# Patient Record
Sex: FEMALE | Race: WHITE | Employment: OTHER | URBAN - METROPOLITAN AREA
[De-identification: names, ages, dates, MRNs, and addresses within clinical notes are randomized per-mention and may not be internally consistent; named-entity substitution may affect disease eponyms.]

---

## 2019-09-17 ENCOUNTER — HOSPITAL ENCOUNTER (INPATIENT)
Age: 69
LOS: 2 days | Discharge: HOME | DRG: 291 | End: 2019-09-19
Attending: EMERGENCY MEDICINE | Admitting: INTERNAL MEDICINE
Payer: MEDICARE

## 2019-09-17 ENCOUNTER — APPOINTMENT (OUTPATIENT)
Dept: CT IMAGING | Age: 69
DRG: 291 | End: 2019-09-17
Payer: MEDICARE

## 2019-09-17 ENCOUNTER — APPOINTMENT (OUTPATIENT)
Dept: GENERAL RADIOLOGY | Age: 69
DRG: 291 | End: 2019-09-17
Payer: MEDICARE

## 2019-09-17 DIAGNOSIS — I50.1 ACUTE LEFT-SIDED CHF (CONGESTIVE HEART FAILURE) (HCC): ICD-10-CM

## 2019-09-17 DIAGNOSIS — E87.70 HYPERVOLEMIA, UNSPECIFIED HYPERVOLEMIA TYPE: ICD-10-CM

## 2019-09-17 DIAGNOSIS — R06.09 DYSPNEA ON EXERTION: Primary | ICD-10-CM

## 2019-09-17 LAB
ALBUMIN SERPL-MCNC: 4.3 G/DL (ref 3.4–5)
ALP BLD-CCNC: 73 U/L (ref 40–129)
ALT SERPL-CCNC: 13 U/L (ref 10–40)
ANION GAP SERPL CALCULATED.3IONS-SCNC: 14 MMOL/L (ref 3–16)
AST SERPL-CCNC: 15 U/L (ref 15–37)
BASE EXCESS VENOUS: 7 (ref -3–3)
BASOPHILS ABSOLUTE: 0.1 K/UL (ref 0–0.2)
BASOPHILS RELATIVE PERCENT: 1 %
BILIRUB SERPL-MCNC: 1 MG/DL (ref 0–1)
BILIRUBIN DIRECT: <0.2 MG/DL (ref 0–0.3)
BILIRUBIN URINE: NEGATIVE
BILIRUBIN, INDIRECT: NORMAL MG/DL (ref 0–1)
BLOOD, URINE: NEGATIVE
BUN BLDV-MCNC: 20 MG/DL (ref 7–20)
CALCIUM SERPL-MCNC: 8.7 MG/DL (ref 8.3–10.6)
CHLORIDE BLD-SCNC: 99 MMOL/L (ref 99–110)
CLARITY: CLEAR
CO2: 28 MMOL/L (ref 21–32)
COLOR: YELLOW
CREAT SERPL-MCNC: 0.7 MG/DL (ref 0.6–1.2)
EOSINOPHILS ABSOLUTE: 0.3 K/UL (ref 0–0.6)
EOSINOPHILS RELATIVE PERCENT: 3.7 %
GFR AFRICAN AMERICAN: >60
GFR NON-AFRICAN AMERICAN: >60
GLUCOSE BLD-MCNC: 108 MG/DL (ref 70–99)
GLUCOSE URINE: NEGATIVE MG/DL
HCO3 VENOUS: 31.3 MMOL/L (ref 23–29)
HCT VFR BLD CALC: 34.4 % (ref 36–48)
HEMOGLOBIN: 11.4 G/DL (ref 12–16)
INR BLD: 1.91 (ref 0.86–1.14)
KETONES, URINE: NEGATIVE MG/DL
LEUKOCYTE ESTERASE, URINE: NEGATIVE
LIPASE: 30 U/L (ref 13–60)
LYMPHOCYTES ABSOLUTE: 1.1 K/UL (ref 1–5.1)
LYMPHOCYTES RELATIVE PERCENT: 15.4 %
MCH RBC QN AUTO: 30.3 PG (ref 26–34)
MCHC RBC AUTO-ENTMCNC: 33.2 G/DL (ref 31–36)
MCV RBC AUTO: 91.3 FL (ref 80–100)
MICROSCOPIC EXAMINATION: NORMAL
MONOCYTES ABSOLUTE: 0.4 K/UL (ref 0–1.3)
MONOCYTES RELATIVE PERCENT: 5.7 %
NEUTROPHILS ABSOLUTE: 5.2 K/UL (ref 1.7–7.7)
NEUTROPHILS RELATIVE PERCENT: 74.2 %
NITRITE, URINE: NEGATIVE
O2 SAT, VEN: 84 %
PCO2, VEN: 45.5 MM HG (ref 40–50)
PDW BLD-RTO: 14.7 % (ref 12.4–15.4)
PERFORMED ON: ABNORMAL
PH UA: 6 (ref 5–8)
PH VENOUS: 7.45 (ref 7.35–7.45)
PLATELET # BLD: 260 K/UL (ref 135–450)
PMV BLD AUTO: 8.8 FL (ref 5–10.5)
PO2, VEN: 47 MM HG
POC SAMPLE TYPE: ABNORMAL
POTASSIUM REFLEX MAGNESIUM: 4.4 MMOL/L (ref 3.5–5.1)
PRO-BNP: 301 PG/ML (ref 0–124)
PROTEIN UA: NEGATIVE MG/DL
PROTHROMBIN TIME: 21.8 SEC (ref 9.8–13)
RBC # BLD: 3.77 M/UL (ref 4–5.2)
SODIUM BLD-SCNC: 141 MMOL/L (ref 136–145)
SPECIFIC GRAVITY UA: 1.01 (ref 1–1.03)
T4 FREE: 2.3 NG/DL (ref 0.9–1.8)
TCO2 CALC VENOUS: 33 MMOL/L
TOTAL PROTEIN: 7.1 G/DL (ref 6.4–8.2)
TROPONIN: <0.01 NG/ML
TSH REFLEX: 0.1 UIU/ML (ref 0.27–4.2)
URINE TYPE: NORMAL
UROBILINOGEN, URINE: 0.2 E.U./DL
WBC # BLD: 7 K/UL (ref 4–11)

## 2019-09-17 PROCEDURE — 83690 ASSAY OF LIPASE: CPT

## 2019-09-17 PROCEDURE — 6360000004 HC RX CONTRAST MEDICATION: Performed by: STUDENT IN AN ORGANIZED HEALTH CARE EDUCATION/TRAINING PROGRAM

## 2019-09-17 PROCEDURE — 85610 PROTHROMBIN TIME: CPT

## 2019-09-17 PROCEDURE — 84484 ASSAY OF TROPONIN QUANT: CPT

## 2019-09-17 PROCEDURE — 85025 COMPLETE CBC W/AUTO DIFF WBC: CPT

## 2019-09-17 PROCEDURE — 81003 URINALYSIS AUTO W/O SCOPE: CPT

## 2019-09-17 PROCEDURE — 84443 ASSAY THYROID STIM HORMONE: CPT

## 2019-09-17 PROCEDURE — 6360000002 HC RX W HCPCS: Performed by: INTERNAL MEDICINE

## 2019-09-17 PROCEDURE — 93005 ELECTROCARDIOGRAM TRACING: CPT | Performed by: STUDENT IN AN ORGANIZED HEALTH CARE EDUCATION/TRAINING PROGRAM

## 2019-09-17 PROCEDURE — 83880 ASSAY OF NATRIURETIC PEPTIDE: CPT

## 2019-09-17 PROCEDURE — 99223 1ST HOSP IP/OBS HIGH 75: CPT | Performed by: INTERNAL MEDICINE

## 2019-09-17 PROCEDURE — 84439 ASSAY OF FREE THYROXINE: CPT

## 2019-09-17 PROCEDURE — 99285 EMERGENCY DEPT VISIT HI MDM: CPT

## 2019-09-17 PROCEDURE — 2060000000 HC ICU INTERMEDIATE R&B

## 2019-09-17 PROCEDURE — 71046 X-RAY EXAM CHEST 2 VIEWS: CPT

## 2019-09-17 PROCEDURE — 6370000000 HC RX 637 (ALT 250 FOR IP): Performed by: INTERNAL MEDICINE

## 2019-09-17 PROCEDURE — 82803 BLOOD GASES ANY COMBINATION: CPT

## 2019-09-17 PROCEDURE — 2580000003 HC RX 258: Performed by: INTERNAL MEDICINE

## 2019-09-17 PROCEDURE — 36415 COLL VENOUS BLD VENIPUNCTURE: CPT

## 2019-09-17 PROCEDURE — 80076 HEPATIC FUNCTION PANEL: CPT

## 2019-09-17 PROCEDURE — 80048 BASIC METABOLIC PNL TOTAL CA: CPT

## 2019-09-17 PROCEDURE — 6370000000 HC RX 637 (ALT 250 FOR IP): Performed by: STUDENT IN AN ORGANIZED HEALTH CARE EDUCATION/TRAINING PROGRAM

## 2019-09-17 PROCEDURE — 71275 CT ANGIOGRAPHY CHEST: CPT

## 2019-09-17 RX ORDER — ONDANSETRON 2 MG/ML
4 INJECTION INTRAMUSCULAR; INTRAVENOUS EVERY 6 HOURS PRN
Status: DISCONTINUED | OUTPATIENT
Start: 2019-09-17 | End: 2019-09-19 | Stop reason: HOSPADM

## 2019-09-17 RX ORDER — NIFEDIPINE 30 MG/1
30 TABLET, FILM COATED, EXTENDED RELEASE ORAL DAILY
Status: DISCONTINUED | OUTPATIENT
Start: 2019-09-18 | End: 2019-09-18

## 2019-09-17 RX ORDER — SODIUM CHLORIDE 0.9 % (FLUSH) 0.9 %
10 SYRINGE (ML) INJECTION PRN
Status: DISCONTINUED | OUTPATIENT
Start: 2019-09-17 | End: 2019-09-19 | Stop reason: HOSPADM

## 2019-09-17 RX ORDER — ROPINIROLE 1 MG/1
2 TABLET, FILM COATED ORAL 3 TIMES DAILY PRN
Status: DISCONTINUED | OUTPATIENT
Start: 2019-09-17 | End: 2019-09-19 | Stop reason: HOSPADM

## 2019-09-17 RX ORDER — POLYVINYL ALCOHOL 14 MG/ML
1 SOLUTION/ DROPS OPHTHALMIC DAILY
Status: DISCONTINUED | OUTPATIENT
Start: 2019-09-17 | End: 2019-09-19 | Stop reason: HOSPADM

## 2019-09-17 RX ORDER — CALCIUM CARBONATE 500(1250)
1000 TABLET ORAL 3 TIMES DAILY
Status: DISCONTINUED | OUTPATIENT
Start: 2019-09-17 | End: 2019-09-19 | Stop reason: HOSPADM

## 2019-09-17 RX ORDER — WARFARIN SODIUM 5 MG/1
5 TABLET ORAL EVERY EVENING
Status: DISCONTINUED | OUTPATIENT
Start: 2019-09-17 | End: 2019-09-18 | Stop reason: DRUGHIGH

## 2019-09-17 RX ORDER — CYCLOBENZAPRINE HCL 10 MG
10 TABLET ORAL 3 TIMES DAILY PRN
Status: DISCONTINUED | OUTPATIENT
Start: 2019-09-17 | End: 2019-09-19 | Stop reason: HOSPADM

## 2019-09-17 RX ORDER — SODIUM CHLORIDE 0.9 % (FLUSH) 0.9 %
10 SYRINGE (ML) INJECTION EVERY 12 HOURS SCHEDULED
Status: DISCONTINUED | OUTPATIENT
Start: 2019-09-17 | End: 2019-09-19 | Stop reason: HOSPADM

## 2019-09-17 RX ORDER — IBUPROFEN 200 MG
2400 CAPSULE ORAL 3 TIMES DAILY
COMMUNITY
End: 2022-01-06 | Stop reason: CLARIF

## 2019-09-17 RX ORDER — GABAPENTIN 400 MG/1
400 CAPSULE ORAL 2 TIMES DAILY
Status: DISCONTINUED | OUTPATIENT
Start: 2019-09-17 | End: 2019-09-19 | Stop reason: HOSPADM

## 2019-09-17 RX ORDER — ZOLPIDEM TARTRATE 5 MG/1
5 TABLET ORAL NIGHTLY PRN
Status: DISCONTINUED | OUTPATIENT
Start: 2019-09-17 | End: 2019-09-19 | Stop reason: HOSPADM

## 2019-09-17 RX ORDER — ALPRAZOLAM 0.5 MG/1
0.5 TABLET ORAL NIGHTLY PRN
Status: DISCONTINUED | OUTPATIENT
Start: 2019-09-17 | End: 2019-09-19 | Stop reason: HOSPADM

## 2019-09-17 RX ORDER — VALSARTAN AND HYDROCHLOROTHIAZIDE 160; 12.5 MG/1; MG/1
1 TABLET, FILM COATED ORAL DAILY
Status: DISCONTINUED | OUTPATIENT
Start: 2019-09-18 | End: 2019-09-18

## 2019-09-17 RX ORDER — HYDROCODONE BITARTRATE AND ACETAMINOPHEN 5; 325 MG/1; MG/1
1 TABLET ORAL ONCE
Status: COMPLETED | OUTPATIENT
Start: 2019-09-17 | End: 2019-09-17

## 2019-09-17 RX ORDER — FUROSEMIDE 10 MG/ML
40 INJECTION INTRAMUSCULAR; INTRAVENOUS 2 TIMES DAILY
Status: DISCONTINUED | OUTPATIENT
Start: 2019-09-17 | End: 2019-09-19

## 2019-09-17 RX ORDER — HYDROCODONE BITARTRATE AND ACETAMINOPHEN 5; 325 MG/1; MG/1
1 TABLET ORAL EVERY 6 HOURS PRN
Status: DISCONTINUED | OUTPATIENT
Start: 2019-09-17 | End: 2019-09-19 | Stop reason: HOSPADM

## 2019-09-17 RX ADMIN — HYDROCODONE BITARTRATE AND ACETAMINOPHEN 1 TABLET: 5; 325 TABLET ORAL at 11:41

## 2019-09-17 RX ADMIN — Medication 10 ML: at 20:10

## 2019-09-17 RX ADMIN — FUROSEMIDE 40 MG: 10 INJECTION, SOLUTION INTRAMUSCULAR; INTRAVENOUS at 16:53

## 2019-09-17 RX ADMIN — CALCIUM 1000 MG: 500 TABLET ORAL at 20:08

## 2019-09-17 RX ADMIN — IOPAMIDOL 80 ML: 755 INJECTION, SOLUTION INTRAVENOUS at 12:12

## 2019-09-17 RX ADMIN — ROPINIROLE HYDROCHLORIDE 2 MG: 1 TABLET, FILM COATED ORAL at 20:09

## 2019-09-17 RX ADMIN — WARFARIN SODIUM 5 MG: 5 TABLET ORAL at 18:11

## 2019-09-17 RX ADMIN — HYDROCODONE BITARTRATE AND ACETAMINOPHEN 1 TABLET: 5; 325 TABLET ORAL at 18:11

## 2019-09-17 RX ADMIN — CALCIUM 1000 MG: 500 TABLET ORAL at 18:11

## 2019-09-17 RX ADMIN — GABAPENTIN 400 MG: 400 CAPSULE ORAL at 20:08

## 2019-09-17 RX ADMIN — ENOXAPARIN SODIUM 150 MG: 150 INJECTION SUBCUTANEOUS at 20:10

## 2019-09-17 ASSESSMENT — PAIN DESCRIPTION - ORIENTATION: ORIENTATION: OTHER (COMMENT)

## 2019-09-17 ASSESSMENT — PAIN SCALES - GENERAL
PAINLEVEL_OUTOF10: 3
PAINLEVEL_OUTOF10: 6
PAINLEVEL_OUTOF10: 2
PAINLEVEL_OUTOF10: 6
PAINLEVEL_OUTOF10: 3
PAINLEVEL_OUTOF10: 3

## 2019-09-17 ASSESSMENT — PAIN DESCRIPTION - DESCRIPTORS
DESCRIPTORS: ACHING;SORE

## 2019-09-17 ASSESSMENT — PAIN DESCRIPTION - PAIN TYPE
TYPE: CHRONIC PAIN
TYPE: CHRONIC PAIN

## 2019-09-17 ASSESSMENT — PAIN DESCRIPTION - LOCATION
LOCATION: GENERALIZED
LOCATION: GENERALIZED

## 2019-09-17 ASSESSMENT — PAIN DESCRIPTION - PROGRESSION
CLINICAL_PROGRESSION: GRADUALLY IMPROVING
CLINICAL_PROGRESSION: GRADUALLY WORSENING
CLINICAL_PROGRESSION: GRADUALLY IMPROVING

## 2019-09-17 ASSESSMENT — PAIN DESCRIPTION - FREQUENCY
FREQUENCY: CONTINUOUS
FREQUENCY: CONTINUOUS

## 2019-09-17 ASSESSMENT — PAIN DESCRIPTION - ONSET
ONSET: ON-GOING
ONSET: ON-GOING

## 2019-09-17 NOTE — ED PROVIDER NOTES
available for comparison    RADIOLOGY:  CTA PULMONARY W CONTRAST   Final Result      1. No evidence of pulmonary embolus. No acute cardiopulmonary abnormality. XR CHEST STANDARD (2 VW)   Final Result      Clear lungs. Normal cardiomediastinal silhouette. Lumbar intervertebral disc spacer noted.           LABS:   Results for orders placed or performed during the hospital encounter of 09/17/19   CBC Auto Differential   Result Value Ref Range    WBC 7.0 4.0 - 11.0 K/uL    RBC 3.77 (L) 4.00 - 5.20 M/uL    Hemoglobin 11.4 (L) 12.0 - 16.0 g/dL    Hematocrit 34.4 (L) 36.0 - 48.0 %    MCV 91.3 80.0 - 100.0 fL    MCH 30.3 26.0 - 34.0 pg    MCHC 33.2 31.0 - 36.0 g/dL    RDW 14.7 12.4 - 15.4 %    Platelets 116 754 - 010 K/uL    MPV 8.8 5.0 - 10.5 fL    Neutrophils % 74.2 %    Lymphocytes % 15.4 %    Monocytes % 5.7 %    Eosinophils % 3.7 %    Basophils % 1.0 %    Neutrophils Absolute 5.2 1.7 - 7.7 K/uL    Lymphocytes Absolute 1.1 1.0 - 5.1 K/uL    Monocytes Absolute 0.4 0.0 - 1.3 K/uL    Eosinophils Absolute 0.3 0.0 - 0.6 K/uL    Basophils Absolute 0.1 0.0 - 0.2 K/uL   Basic Metabolic Panel w/ Reflex to MG   Result Value Ref Range    Sodium 141 136 - 145 mmol/L    Potassium reflex Magnesium 4.4 3.5 - 5.1 mmol/L    Chloride 99 99 - 110 mmol/L    CO2 28 21 - 32 mmol/L    Anion Gap 14 3 - 16    Glucose 108 (H) 70 - 99 mg/dL    BUN 20 7 - 20 mg/dL    CREATININE 0.7 0.6 - 1.2 mg/dL    GFR Non-African American >60 >60    GFR African American >60 >60    Calcium 8.7 8.3 - 10.6 mg/dL   Hepatic Function Panel   Result Value Ref Range    Total Protein 7.1 6.4 - 8.2 g/dL    Alb 4.3 3.4 - 5.0 g/dL    Alkaline Phosphatase 73 40 - 129 U/L    ALT 13 10 - 40 U/L    AST 15 15 - 37 U/L    Total Bilirubin 1.0 0.0 - 1.0 mg/dL    Bilirubin, Direct <0.2 0.0 - 0.3 mg/dL    Bilirubin, Indirect see below 0.0 - 1.0 mg/dL   Lipase   Result Value Ref Range    Lipase 30.0 13.0 - 60.0 U/L   Troponin   Result Value Ref Range    Troponin <0.01 <0.01 ng/mL   Brain Natriuretic Peptide   Result Value Ref Range    Pro- (H) 0 - 124 pg/mL   Protime-INR   Result Value Ref Range    Protime 21.8 (H) 9.8 - 13.0 sec    INR 1.91 (H) 0.86 - 1.14   POCT Venous   Result Value Ref Range    pH, [place] 7.446 7.350 - 7.450    pCO2, [place] 45.5 40.0 - 50.0 mm Hg    pO2, [place] 47 Not Established mm Hg    HCO3, Venous 31.3 (H) 23.0 - 29.0 mmol/L    Base Excess, [place] 7 (H) -3 - 3    O2 Sat, [place] 84 Not Established %    TC02 (Calc), [place] 33 Not Established mmol/L    Sample Type TOMEKA     Performed on SEE BELOW        ED BEDSIDE ULTRASOUND:  N/A    RECENT VITALS:  BP: (!) 146/67, Temp: 98.7 °F (37.1 °C), Pulse: 85,Resp: 15, SpO2: 99 %     Procedures   Procedures    ED Course     Nursing Notes, Past Medical Hx, Past Surgical Hx, Social Hx, Allergies, and Family Hx were reviewed. The patient was given the followingmedications:  Orders Placed This Encounter   Medications    HYDROcodone-acetaminophen (NORCO) 5-325 MG per tablet 1 tablet    iopamidol (ISOVUE-370) 76 % injection 80 mL       CONSULTS:  IP CONSULT TO HOSPITALIST  IP CONSULT TO Edison Soler / SHELL / Radha Daniel is a 81 y.o. female here for dyspnea on exertion associated with bilateral lower extremity swelling with a former history of DVT last PE in the setting of a recent parathyroid surgery. Differential includes DVT/PE versus CHF versus ACS versus CKD versus liver disease versus anemia. Patient with a negative initial troponin, minimally elevated BNP which could be elevated in the setting of her body habitus. EKG reassuring. Rest of her work-up was reassuring. Patient had a minimally subtherapeutic INR on warfarin so a CTPA was ordered which did not show any signs of a pulmonary edema or pulmonary embolus. Patient was admitted for further evaluation of her volume overload and dyspnea on exertion. This patient was also evaluated by the attending physician.

## 2019-09-17 NOTE — PLAN OF CARE
Problem: Falls - Risk of:  Goal: Will remain free from falls  Description  Will remain free from falls  Outcome: Ongoing  Low fall risk, call light with in reach. Encourage pt to use call light. Will continue to monitor safety. Problem: Risk for Impaired Skin Integrity  Goal: Tissue integrity - skin and mucous membranes  Description  Structural intactness and normal physiological function of skin and  mucous membranes. Outcome: Ongoing   Patient has surgical incision to neck and scattered bruising. No breakdown, will continue to monitor. Problem: OXYGENATION/RESPIRATORY FUNCTION  Goal: Patient will maintain patent airway  Outcome: Ongoing   Patient off oxygen was sitting at 92 on Ra will continue to monitor.  Will get IV lasix

## 2019-09-17 NOTE — ED PROVIDER NOTES
ED Attending Attestation Note     Date of evaluation: 9/17/2019    This patient was seen by the resident. I have seen and examined the patient, agree with the workup, evaluation, management and diagnosis. The care plan has been discussed. I have reviewed the ECG and concur with the resident's interpretation. My assessment reveals alert female status post thyroid surgery who presents with lower extremity swelling bilateral and shortness of breath. She is on Coumadin for DVTs. She states that does not feel like DVT type of pain.   She is awake alert he has edema noted up into her thighs no respiratory distress     Yohana Tristan MD  09/17/19 3611

## 2019-09-17 NOTE — CONSULTS
[facility]    Cardiology Consult/H&P  Consulting Cardiologist Shawn Martínez M.D., [facility]  Referring Provider:  Sonali Katz MD    9/17/2019, 5:42 PM    Chief Complaint   Patient presents with    Other     weight gain     Shortness of Breath      Primary Cardiologist:  Asked by Levon Kaba MD/Herrera Thompson MD to evaluate and assess this patient's is admitted with shortness of breath and significant leg edema. History of Present Illness:   Mirian Jackson is a 81 y.o. female is known to me from many years ago is admitted currently with increasing edema bilaterally and shortness of breath and generally feeling poorly. She had been on Coumadin long-term for DVT recurring in her legs. She did undergo parathyroidectomy 1 week ago and the last day or 2 has noted significant leg edema and shortness of breath and dyspnea. She thinks that she may have consumed more fluid lately. Denies any chest pains denies any known coronary artery disease. 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 History of pulmonary emboli. Past Medical History:   has a past medical history of DVT (deep venous thrombosis) ([address] Hypertension, Lupus anticoagulant disorder ([facility] [address] and PE (pulmonary thromboembolism) ([facility] [address] Surgical History:   has a past surgical history that includes brain surgery; Cholecystectomy; Foot surgery; Appendectomy; and Tonsillectomy. Social History:   reports that she has never smoked. She has never used smokeless tobacco. She reports that she does not drink alcohol or use drugs. Family History:  family history includes Cancer in her brother and sister; Heart Attack in her mother; Stroke in her father. Home Medications:  Prior to Admission medications    Medication Sig Start Date End Date Taking?  Authorizing Provider   calcium 600 MG TABS tablet Take 2,400 mg by mouth 3 times daily    Yes Historical Provider, MD   warfarin (COUMADIN) 5 MG tablet

## 2019-09-18 ENCOUNTER — APPOINTMENT (OUTPATIENT)
Dept: VASCULAR LAB | Age: 69
DRG: 291 | End: 2019-09-18
Payer: MEDICARE

## 2019-09-18 LAB
ANION GAP SERPL CALCULATED.3IONS-SCNC: 12 MMOL/L (ref 3–16)
BUN BLDV-MCNC: 21 MG/DL (ref 7–20)
CALCIUM SERPL-MCNC: 9.3 MG/DL (ref 8.3–10.6)
CHLORIDE BLD-SCNC: 96 MMOL/L (ref 99–110)
CHOLESTEROL, TOTAL: 153 MG/DL (ref 0–199)
CO2: 31 MMOL/L (ref 21–32)
CREAT SERPL-MCNC: 0.8 MG/DL (ref 0.6–1.2)
EKG ATRIAL RATE: 76 BPM
EKG ATRIAL RATE: 94 BPM
EKG DIAGNOSIS: NORMAL
EKG DIAGNOSIS: NORMAL
EKG P AXIS: 15 DEGREES
EKG P AXIS: 89 DEGREES
EKG P-R INTERVAL: 190 MS
EKG P-R INTERVAL: 216 MS
EKG Q-T INTERVAL: 330 MS
EKG Q-T INTERVAL: 374 MS
EKG QRS DURATION: 92 MS
EKG QRS DURATION: 94 MS
EKG QTC CALCULATION (BAZETT): 420 MS
EKG QTC CALCULATION (BAZETT): 468 MS
EKG R AXIS: -6 DEGREES
EKG R AXIS: 10 DEGREES
EKG T AXIS: 11 DEGREES
EKG T AXIS: 18 DEGREES
EKG VENTRICULAR RATE: 121 BPM
EKG VENTRICULAR RATE: 76 BPM
GFR AFRICAN AMERICAN: >60
GFR NON-AFRICAN AMERICAN: >60
GLUCOSE BLD-MCNC: 104 MG/DL (ref 70–99)
HDLC SERPL-MCNC: 51 MG/DL (ref 40–60)
INR BLD: 1.88 (ref 0.86–1.14)
LDL CHOLESTEROL CALCULATED: 64 MG/DL
LV EF: 58 %
LVEF MODALITY: NORMAL
MAGNESIUM: 1.8 MG/DL (ref 1.8–2.4)
POTASSIUM SERPL-SCNC: 3.8 MMOL/L (ref 3.5–5.1)
PROTHROMBIN TIME: 21.4 SEC (ref 9.8–13)
SODIUM BLD-SCNC: 139 MMOL/L (ref 136–145)
TRIGL SERPL-MCNC: 189 MG/DL (ref 0–150)
VLDLC SERPL CALC-MCNC: 38 MG/DL

## 2019-09-18 PROCEDURE — 83735 ASSAY OF MAGNESIUM: CPT

## 2019-09-18 PROCEDURE — C8929 TTE W OR WO FOL WCON,DOPPLER: HCPCS

## 2019-09-18 PROCEDURE — 94760 N-INVAS EAR/PLS OXIMETRY 1: CPT

## 2019-09-18 PROCEDURE — 85610 PROTHROMBIN TIME: CPT

## 2019-09-18 PROCEDURE — 6360000004 HC RX CONTRAST MEDICATION: Performed by: INTERNAL MEDICINE

## 2019-09-18 PROCEDURE — 2580000003 HC RX 258: Performed by: INTERNAL MEDICINE

## 2019-09-18 PROCEDURE — 2060000000 HC ICU INTERMEDIATE R&B

## 2019-09-18 PROCEDURE — 6370000000 HC RX 637 (ALT 250 FOR IP): Performed by: INTERNAL MEDICINE

## 2019-09-18 PROCEDURE — 93005 ELECTROCARDIOGRAM TRACING: CPT | Performed by: INTERNAL MEDICINE

## 2019-09-18 PROCEDURE — 36415 COLL VENOUS BLD VENIPUNCTURE: CPT

## 2019-09-18 PROCEDURE — 6360000002 HC RX W HCPCS: Performed by: EMERGENCY MEDICINE

## 2019-09-18 PROCEDURE — 80061 LIPID PANEL: CPT

## 2019-09-18 PROCEDURE — 6360000002 HC RX W HCPCS: Performed by: INTERNAL MEDICINE

## 2019-09-18 PROCEDURE — 99233 SBSQ HOSP IP/OBS HIGH 50: CPT | Performed by: INTERNAL MEDICINE

## 2019-09-18 PROCEDURE — 93970 EXTREMITY STUDY: CPT

## 2019-09-18 PROCEDURE — 93010 ELECTROCARDIOGRAM REPORT: CPT | Performed by: INTERNAL MEDICINE

## 2019-09-18 PROCEDURE — 90686 IIV4 VACC NO PRSV 0.5 ML IM: CPT | Performed by: EMERGENCY MEDICINE

## 2019-09-18 PROCEDURE — APPSS30 APP SPLIT SHARED TIME 16-30 MINUTES: Performed by: NURSE PRACTITIONER

## 2019-09-18 PROCEDURE — G0008 ADMIN INFLUENZA VIRUS VAC: HCPCS | Performed by: EMERGENCY MEDICINE

## 2019-09-18 PROCEDURE — 80048 BASIC METABOLIC PNL TOTAL CA: CPT

## 2019-09-18 RX ORDER — POTASSIUM CHLORIDE 20 MEQ/1
20 TABLET, EXTENDED RELEASE ORAL ONCE
Status: COMPLETED | OUTPATIENT
Start: 2019-09-18 | End: 2019-09-18

## 2019-09-18 RX ORDER — WARFARIN SODIUM 5 MG/1
5 TABLET ORAL DAILY
Status: DISCONTINUED | OUTPATIENT
Start: 2019-09-19 | End: 2019-09-19 | Stop reason: HOSPADM

## 2019-09-18 RX ORDER — WARFARIN SODIUM 7.5 MG/1
7.5 TABLET ORAL
Status: COMPLETED | OUTPATIENT
Start: 2019-09-18 | End: 2019-09-18

## 2019-09-18 RX ADMIN — FUROSEMIDE 40 MG: 10 INJECTION, SOLUTION INTRAMUSCULAR; INTRAVENOUS at 10:07

## 2019-09-18 RX ADMIN — ENOXAPARIN SODIUM 150 MG: 150 INJECTION SUBCUTANEOUS at 21:30

## 2019-09-18 RX ADMIN — GABAPENTIN 400 MG: 400 CAPSULE ORAL at 10:07

## 2019-09-18 RX ADMIN — ENOXAPARIN SODIUM 150 MG: 150 INJECTION SUBCUTANEOUS at 09:38

## 2019-09-18 RX ADMIN — WARFARIN SODIUM 7.5 MG: 7.5 TABLET ORAL at 17:29

## 2019-09-18 RX ADMIN — GABAPENTIN 400 MG: 400 CAPSULE ORAL at 21:30

## 2019-09-18 RX ADMIN — HYDROCODONE BITARTRATE AND ACETAMINOPHEN 1 TABLET: 5; 325 TABLET ORAL at 12:41

## 2019-09-18 RX ADMIN — CALCIUM 1000 MG: 500 TABLET ORAL at 10:07

## 2019-09-18 RX ADMIN — Medication 10 ML: at 09:36

## 2019-09-18 RX ADMIN — CALCIUM 1000 MG: 500 TABLET ORAL at 21:30

## 2019-09-18 RX ADMIN — ROPINIROLE HYDROCHLORIDE 2 MG: 1 TABLET, FILM COATED ORAL at 21:30

## 2019-09-18 RX ADMIN — FUROSEMIDE 40 MG: 10 INJECTION, SOLUTION INTRAMUSCULAR; INTRAVENOUS at 17:28

## 2019-09-18 RX ADMIN — ALPRAZOLAM 0.5 MG: 0.5 TABLET ORAL at 00:07

## 2019-09-18 RX ADMIN — HYDROCODONE BITARTRATE AND ACETAMINOPHEN 1 TABLET: 5; 325 TABLET ORAL at 06:43

## 2019-09-18 RX ADMIN — CALCIUM 1000 MG: 500 TABLET ORAL at 17:29

## 2019-09-18 RX ADMIN — POLYVINYL ALCOHOL 1 DROP: 14 SOLUTION/ DROPS OPHTHALMIC at 09:37

## 2019-09-18 RX ADMIN — HYDROCODONE BITARTRATE AND ACETAMINOPHEN 1 TABLET: 5; 325 TABLET ORAL at 00:06

## 2019-09-18 RX ADMIN — POTASSIUM CHLORIDE 20 MEQ: 20 TABLET, EXTENDED RELEASE ORAL at 12:41

## 2019-09-18 RX ADMIN — ZOLPIDEM TARTRATE 5 MG: 5 TABLET ORAL at 00:06

## 2019-09-18 RX ADMIN — INFLUENZA A VIRUS A/BRISBANE/02/2018 IVR-190 (H1N1) ANTIGEN (PROPIOLACTONE INACTIVATED), INFLUENZA A VIRUS A/KANSAS/14/2017 X-327 (H3N2) ANTIGEN (PROPIOLACTONE INACTIVATED), INFLUENZA B VIRUS B/MARYLAND/15/2016 ANTIGEN (PROPIOLACTONE INACTIVATED), INFLUENZA B VIRUS B/PHUKET/3073/2013 BVR-1B ANTIGEN (PROPIOLACTONE INACTIVATED) 0.5 ML: 15; 15; 15; 15 INJECTION, SUSPENSION INTRAMUSCULAR at 10:26

## 2019-09-18 RX ADMIN — HYDROCODONE BITARTRATE AND ACETAMINOPHEN 1 TABLET: 5; 325 TABLET ORAL at 18:44

## 2019-09-18 RX ADMIN — Medication 10 ML: at 21:31

## 2019-09-18 RX ADMIN — PERFLUTREN 2.2 MG: 6.52 INJECTION, SUSPENSION INTRAVENOUS at 13:54

## 2019-09-18 ASSESSMENT — PAIN DESCRIPTION - PROGRESSION
CLINICAL_PROGRESSION: GRADUALLY WORSENING
CLINICAL_PROGRESSION: NOT CHANGED
CLINICAL_PROGRESSION: GRADUALLY WORSENING
CLINICAL_PROGRESSION: GRADUALLY IMPROVING
CLINICAL_PROGRESSION: GRADUALLY WORSENING

## 2019-09-18 ASSESSMENT — PAIN DESCRIPTION - FREQUENCY
FREQUENCY: INTERMITTENT
FREQUENCY: INTERMITTENT
FREQUENCY: CONTINUOUS

## 2019-09-18 ASSESSMENT — PAIN SCALES - GENERAL
PAINLEVEL_OUTOF10: 4
PAINLEVEL_OUTOF10: 5
PAINLEVEL_OUTOF10: 6
PAINLEVEL_OUTOF10: 4
PAINLEVEL_OUTOF10: 5
PAINLEVEL_OUTOF10: 6
PAINLEVEL_OUTOF10: 7
PAINLEVEL_OUTOF10: 3
PAINLEVEL_OUTOF10: 6

## 2019-09-18 ASSESSMENT — PAIN DESCRIPTION - PAIN TYPE
TYPE: CHRONIC PAIN

## 2019-09-18 ASSESSMENT — PAIN DESCRIPTION - ONSET
ONSET: ON-GOING

## 2019-09-18 ASSESSMENT — PAIN DESCRIPTION - LOCATION
LOCATION: GENERALIZED
LOCATION: GENERALIZED
LOCATION: GENERALIZED;HEAD

## 2019-09-18 ASSESSMENT — PAIN DESCRIPTION - DESCRIPTORS
DESCRIPTORS: ACHING;SORE
DESCRIPTORS: DISCOMFORT;HEADACHE
DESCRIPTORS: DISCOMFORT

## 2019-09-18 ASSESSMENT — PAIN DESCRIPTION - ORIENTATION: ORIENTATION: OTHER (COMMENT)

## 2019-09-18 NOTE — PLAN OF CARE
Problem: Pain:  Goal: Pain level will decrease  Description  Pain level will decrease  Outcome: Ongoing  Note:   Pt reports all over, generalized discomfort this shift. Improved with administration of prn norco. Will continue to monitor and reassess. Problem: Falls - Risk of:  Goal: Will remain free from falls  Description  Will remain free from falls  Note:   Pt up in the room independently with steady gait. Medium fall risk per jeff fall scale. Displays appropriate judgement and verbalizes understanding to request assistance from RN staff if feeling light headed, dizzy. Will continue to monitor. Problem: HEMODYNAMIC STATUS  Goal: Patient has stable vital signs and fluid balance  Note:   Pt remains hemodynamically stable at this time. Vss. Denies chest pain, sob, lightheadedness, dizziness, or palpitations. Strict I/Os maintained with daily weights. NSR on tele. SpO2 remains >92% on room air . Will continue to monitor.

## 2019-09-18 NOTE — CARE COORDINATION
anticipate any DC needs at this time. Tracie Kc and her family were provided with choice of provider; she and her family are in agreement with the discharge plan.     Care Transition patient: Susan Vaughan   Case Management Department  Ph: [phone]

## 2019-09-19 VITALS
TEMPERATURE: 97.9 F | OXYGEN SATURATION: 100 % | DIASTOLIC BLOOD PRESSURE: 57 MMHG | HEART RATE: 84 BPM | HEIGHT: 61 IN | BODY MASS INDEX: 55.32 KG/M2 | RESPIRATION RATE: 18 BRPM | SYSTOLIC BLOOD PRESSURE: 130 MMHG | WEIGHT: 293 LBS

## 2019-09-19 LAB
ANION GAP SERPL CALCULATED.3IONS-SCNC: 11 MMOL/L (ref 3–16)
BUN BLDV-MCNC: 20 MG/DL (ref 7–20)
CALCIUM SERPL-MCNC: 9.9 MG/DL (ref 8.3–10.6)
CHLORIDE BLD-SCNC: 93 MMOL/L (ref 99–110)
CO2: 36 MMOL/L (ref 21–32)
CREAT SERPL-MCNC: 0.9 MG/DL (ref 0.6–1.2)
GFR AFRICAN AMERICAN: >60
GFR NON-AFRICAN AMERICAN: >60
GLUCOSE BLD-MCNC: 124 MG/DL (ref 70–99)
INR BLD: 2.01 (ref 0.86–1.14)
MAGNESIUM: 1.9 MG/DL (ref 1.8–2.4)
POTASSIUM SERPL-SCNC: 3.4 MMOL/L (ref 3.5–5.1)
PROTHROMBIN TIME: 22.9 SEC (ref 9.8–13)
SODIUM BLD-SCNC: 140 MMOL/L (ref 136–145)
T4 FREE: 2 NG/DL (ref 0.9–1.8)
TSH REFLEX: 0.2 UIU/ML (ref 0.27–4.2)

## 2019-09-19 PROCEDURE — 6360000002 HC RX W HCPCS: Performed by: INTERNAL MEDICINE

## 2019-09-19 PROCEDURE — 99233 SBSQ HOSP IP/OBS HIGH 50: CPT | Performed by: INTERNAL MEDICINE

## 2019-09-19 PROCEDURE — 84439 ASSAY OF FREE THYROXINE: CPT

## 2019-09-19 PROCEDURE — 36415 COLL VENOUS BLD VENIPUNCTURE: CPT

## 2019-09-19 PROCEDURE — 80048 BASIC METABOLIC PNL TOTAL CA: CPT

## 2019-09-19 PROCEDURE — 6370000000 HC RX 637 (ALT 250 FOR IP): Performed by: INTERNAL MEDICINE

## 2019-09-19 PROCEDURE — 2580000003 HC RX 258: Performed by: INTERNAL MEDICINE

## 2019-09-19 PROCEDURE — 85610 PROTHROMBIN TIME: CPT

## 2019-09-19 PROCEDURE — 84443 ASSAY THYROID STIM HORMONE: CPT

## 2019-09-19 PROCEDURE — 83735 ASSAY OF MAGNESIUM: CPT

## 2019-09-19 RX ORDER — POTASSIUM CHLORIDE 20 MEQ/1
40 TABLET, EXTENDED RELEASE ORAL ONCE
Status: COMPLETED | OUTPATIENT
Start: 2019-09-19 | End: 2019-09-19

## 2019-09-19 RX ORDER — POTASSIUM CHLORIDE 20 MEQ/1
20 TABLET, EXTENDED RELEASE ORAL DAILY
Qty: 30 TABLET | Refills: 3 | Status: SHIPPED | OUTPATIENT
Start: 2019-09-19 | End: 2022-01-06 | Stop reason: CLARIF

## 2019-09-19 RX ORDER — FUROSEMIDE 40 MG/1
40 TABLET ORAL DAILY
Qty: 30 TABLET | Refills: 3 | Status: SHIPPED | OUTPATIENT
Start: 2019-09-19 | End: 2020-09-09

## 2019-09-19 RX ADMIN — POTASSIUM CHLORIDE 40 MEQ: 1500 TABLET, EXTENDED RELEASE ORAL at 10:29

## 2019-09-19 RX ADMIN — CALCIUM 1000 MG: 500 TABLET ORAL at 08:59

## 2019-09-19 RX ADMIN — GABAPENTIN 400 MG: 400 CAPSULE ORAL at 08:59

## 2019-09-19 RX ADMIN — ONDANSETRON 4 MG: 2 INJECTION INTRAMUSCULAR; INTRAVENOUS at 00:22

## 2019-09-19 RX ADMIN — HYDROCODONE BITARTRATE AND ACETAMINOPHEN 1 TABLET: 5; 325 TABLET ORAL at 00:23

## 2019-09-19 RX ADMIN — ALPRAZOLAM 0.5 MG: 0.5 TABLET ORAL at 00:23

## 2019-09-19 RX ADMIN — ZOLPIDEM TARTRATE 5 MG: 5 TABLET ORAL at 00:23

## 2019-09-19 RX ADMIN — POLYVINYL ALCOHOL 1 DROP: 14 SOLUTION/ DROPS OPHTHALMIC at 09:04

## 2019-09-19 RX ADMIN — FUROSEMIDE 40 MG: 10 INJECTION, SOLUTION INTRAMUSCULAR; INTRAVENOUS at 08:59

## 2019-09-19 RX ADMIN — HYDROCODONE BITARTRATE AND ACETAMINOPHEN 1 TABLET: 5; 325 TABLET ORAL at 14:40

## 2019-09-19 RX ADMIN — Medication 10 ML: at 08:59

## 2019-09-19 RX ADMIN — HYDROCODONE BITARTRATE AND ACETAMINOPHEN 1 TABLET: 5; 325 TABLET ORAL at 06:32

## 2019-09-19 ASSESSMENT — PAIN SCALES - GENERAL
PAINLEVEL_OUTOF10: 3
PAINLEVEL_OUTOF10: 6
PAINLEVEL_OUTOF10: 5
PAINLEVEL_OUTOF10: 7
PAINLEVEL_OUTOF10: 3
PAINLEVEL_OUTOF10: 0

## 2019-09-19 ASSESSMENT — PAIN DESCRIPTION - LOCATION
LOCATION: GENERALIZED
LOCATION: HEAD
LOCATION: HEAD

## 2019-09-19 ASSESSMENT — PAIN DESCRIPTION - FREQUENCY
FREQUENCY: INTERMITTENT

## 2019-09-19 ASSESSMENT — PAIN DESCRIPTION - PAIN TYPE
TYPE: CHRONIC PAIN

## 2019-09-19 ASSESSMENT — PAIN DESCRIPTION - ONSET
ONSET: ON-GOING

## 2019-09-19 ASSESSMENT — PAIN DESCRIPTION - PROGRESSION
CLINICAL_PROGRESSION: GRADUALLY WORSENING

## 2019-09-19 ASSESSMENT — PAIN DESCRIPTION - DESCRIPTORS
DESCRIPTORS: HEADACHE
DESCRIPTORS: HEADACHE
DESCRIPTORS: DISCOMFORT

## 2019-09-19 ASSESSMENT — PAIN DESCRIPTION - ORIENTATION: ORIENTATION: RIGHT;LEFT

## 2019-09-19 ASSESSMENT — PAIN - FUNCTIONAL ASSESSMENT: PAIN_FUNCTIONAL_ASSESSMENT: PREVENTS OR INTERFERES SOME ACTIVE ACTIVITIES AND ADLS

## 2019-09-19 NOTE — DISCHARGE SUMMARY
free T4 2.3. Clinically looks euthyroid. Patient denied taking biotin supplements, no prior history of abnormal testing. TSH was repeated to confirm and results were pending at the time of discharge. Patient was asked to follow up with her endocrinologist on this.        Consults:     IP CONSULT TO HOSPITALIST  IP CONSULT TO SPIRITUAL SERVICES  IP CONSULT TO HEART FAILURE NURSE/COORDINATOR  IP CONSULT TO DIETITIAN  IP CONSULT TO PHARMACY  IP CONSULT TO CARDIOLOGY      Disposition:  Home     Discharged Condition: Stable    Code Status: Full Code    Activity: activity as tolerated    Diet: cardiac diet    Follow Up: Primary Care Physician in one week    Exam:     General appearance: No apparent distress, appears stated age and cooperative. Lungs: Clear to ascultation, bilaterally without Rales/Wheezes/Rhonchi with good respiratory effort. Heart: Regular rate and rhythm with Normal S1/S2 without  murmurs, rubs or gallops, point of maximum impulse non-displaced  Abdomen: Soft, non-tender or non-distended without rigidity or guarding and positive bowel sounds all four quadrants. Extremities: No clubbing, cyanosis, or edema bilaterally. Skin: Skin color, texture, turgor normal.    Neurologic: Alert and oriented X 3,  grossly non-focal.  Mental status: Alert, oriented, thought content appropriate      Labs:  For convenience and continuity at follow-up the following most recent labs are provided:    CBC:   Lab Results   Component Value Date    WBC 7.0 09/17/2019    HGB 11.4 09/17/2019    HCT 34.4 09/17/2019     09/17/2019       RENAL:   Lab Results   Component Value Date     09/19/2019    K 3.4 09/19/2019    K 4.4 09/17/2019    CL 93 09/19/2019    CO2 36 09/19/2019    BUN 20 09/19/2019    CREATININE 0.9 09/19/2019           Discharge Medications:   Current Discharge Medication List           Details   furosemide (LASIX) 40 MG tablet Take 1 tablet by mouth daily  Qty: 30 tablet, Refills: 3      potassium chloride (KLOR-CON M) 20 MEQ extended release tablet Take 1 tablet by mouth daily Take together with lasix  Qty: 30 tablet, Refills: 3              Details   calcium 600 MG TABS tablet Take 2,400 mg by mouth 3 times daily       warfarin (COUMADIN) 5 MG tablet Take 7.5 mg by mouth every evening       HYDROcodone-acetaminophen (NORCO) 5-325 MG per tablet Take 1 tablet by mouth every 6 hours as needed for Pain . ALPRAZolam (XANAX) 0.5 MG tablet Take 0.5 mg by mouth nightly as needed for Sleep      gabapentin (NEURONTIN) 400 MG capsule Take 400 mg by mouth 2 times daily. diclofenac (VOLTAREN) 75 MG EC tablet Take 75 mg by mouth daily      valsartan-hydrochlorothiazide (DIOVAN HCT) 160-12.5 MG per tablet Take 1 tablet by mouth daily      rOPINIRole (REQUIP) 2 MG tablet Take 2 mg by mouth 3 times daily as needed       cycloSPORINE (RESTASIS MULTIDOSE) 0.05 % ophthalmic emulsion Place 1 drop into both eyes 2 times daily      zolpidem (AMBIEN) 5 MG tablet Take 5 mg by mouth nightly as needed for Sleep      cyclobenzaprine (FLEXERIL) 10 MG tablet Take 10 mg by mouth 3 times daily as needed for Muscle spasms                Time Spent on discharge is more than 30 minutes in the examination, evaluation, counseling and review of medications and discharge plan. Signed:  Wayne Edward MD   9/19/2019      Thank you Alina Zhang MD for the opportunity to be involved in this patient's care. If you have any questions or concerns please feel free to contact me at [phone].

## 2019-09-19 NOTE — PROGRESS NOTES
4 Eyes Admission Assessment     I agree as the admission nurse that 2 RN's have performed a thorough Head to Toe Skin Assessment on the patient. ALL assessment sites listed below have been assessed on admission. Areas assessed by both nurses:   [x]   Head, Face, and Ears, neck incision  [x]   Shoulders, Back-birth jacqueline, and Chest  [x]   Arms, Elbows, and Hands   [x]   Coccyx, Sacrum, and Ischum  [x]   Legs small bruise raised area to lower ankle, Feet, and Heels    Redness under folds of breast and abdomen        Does the Patient have Skin Breakdown?   Yes a wound was noted on the Admission Assessment and an LDA was Initiated documentation include the Cathryn-wound, Wound Assessment, Measurements, Dressing Treatment, Drainage, and Color\",         Omar Prevention initiated:  No   Wound Care Orders initiated:  NA      [facility] nurse consulted for Pressure Injury (Stage 3,4, Unstageable, DTI, NWPT, and Complex wounds):  NA      Nurse 1 eSignature: Electronically signed by Zeus Shaw RN on 9/17/19 at 4:27 PM    **SHARE this note so that the co-signing nurse is able to place an eSignature**    Nurse 2 eSignature: Electronically signed by Lakeisha Garcia RN on 9/17/19 at 6:36 PM
Clinical Pharmacy Progress Note    ADMIT DATE: 9/17/2019     INTERVAL UPDATE: Remains on furosemide 40 mg IV BID. -3.75L in past 24h. 82 yo F with PMHx significant for recurrent DVT/PE; s/p recent parathyroidectomy (9/10) who presented to ED 9/17 with SOB and weight gain. Patient is on chronic anticoagulation with warfarin for hx of recurrent DVT/PE. Pharmacy asked to dose warfarin per Dr. Shanell Foster. Home Anticoagulation Regimen:  · Goal INR = 2-3  · Managed as outpatient by patient's hematologist.  · Pt states she was previously stable on warfarin 5 mg daily with therapeutic INR. She held warfarin for ~1 week prior to parathyroidectomy and was bridged with therapeutic enoxaparin. Post-op, she was instructed by ENT to continue warfarin without enoxaparin bridge. She stated she started taking warfarin 7.5 mg daily on Wed 9/11 -- she increased the dose as it \"usually takes a higher dose to get her to therapeutic INR, then she is able to go back to 5 mg daily. \"    LABORATORY:  Recent Labs     09/18/19  0443 09/19/19  0448    140   K 3.8 3.4*   CL 96* 93*   CO2 31 36*   BUN 21* 20   CREATININE 0.8 0.9   GLUCOSE 104* 124*     Estimated Creatinine Clearance: 83 mL/min (based on SCr of 0.9 mg/dL). Lab Results   Component Value Date    WBC 7.0 09/17/2019    HGB 11.4 (L) 09/17/2019    HCT 34.4 (L) 09/17/2019    MCV 91.3 09/17/2019     09/17/2019       Lab Results   Component Value Date    PROTIME 22.9 (H) 09/19/2019    INR 2.01 (H) 09/19/2019     VITALS:  BP (!) 127/53   Pulse 80   Temp 98.7 °F (37.1 °C) (Oral)   Resp 16   Ht 5' 1\" (1.549 m)   Wt (!) 317 lb 11.2 oz (144.1 kg)   SpO2 93%   BMI 60.03 kg/m²     DIET: DIET LOW SODIUM 2 GM;    PROPHYLAXIS:  Stress ulcer: not indicated  VTE:  Enoxaparin 150 mg BID + warfarin    ASSESSMENT/PLAN:    1) Anticoagulation - hx of recurrent DVT/PE: pharmacy to dose warfarin   · Goal INR 2-3  · INR on admission = 1.91 (9/17).    · INR is now therapeutic (2.01) --
Discharge note: Patient has been seen by doctor. Discharge order obtained, and discharge instructions reviewed. Patient educated, using the teach back method, about follow up instructions and discharge instructions. A completed copy of the AVS instructions given to patient and all questions answered. IV catheter removed without complaints, catheter intact, site WNL. Discharged to [facility] via wheel chair per RN.     Electronically signed by Terrie Sherman RN on 9/19/2019 at 3:09 PM
Hospitalist Progress Note      PCP: Sara Mercedes MD    Date of Admission: 2019    Chief Complaint on Admission: shortness of breath and edema    Pt Seen/Examined and Chart Reviewed. Admitting dx new onset CHF    SUBJECTIVE/OBJECTIVE:   Patient is feeling better today, she is negative 3,3 liters and lost 5 lbs. She is not back to her baseline. Gets dyspnea with minimal exertion and leg edema is persistent. No chest pain or dizziness. BP was low overnight. Allergies  Patient has no known allergies. Medications      Scheduled Meds:   warfarin  7.5 mg Oral Once    Followed by   [address] ON 2019] warfarin  5 mg Oral Daily    potassium chloride  20 mEq Oral Once    calcium elemental  1,000 mg Oral TID    polyvinyl alcohol  1 drop Both Eyes Daily    gabapentin  400 mg Oral BID    sodium chloride flush  10 mL Intravenous 2 times per day    enoxaparin  1 mg/kg Subcutaneous BID    furosemide  40 mg Intravenous BID       Infusions:      PRN Meds:  ALPRAZolam, cyclobenzaprine, HYDROcodone-acetaminophen, rOPINIRole, zolpidem, sodium chloride flush, magnesium hydroxide, ondansetron    Vitals    TEMPERATURE:  Current - Temp: 98.6 °F (37 °C); Max - Temp  Av.7 °F (37.1 °C)  Min: 98.5 °F (36.9 °C)  Max: 98.8 °F (37.1 °C)  RESPIRATIONS RANGE: Resp  Av.8  Min: 16  Max: 18  PULSE RANGE: Pulse  Av  Min: 73  Max: 88  BLOOD PRESSURE RANGE:  Systolic (88OHD), XEM:942 , Min:96 , NIH:578   ; Diastolic (51SWC), STERLING:52, Min:47, Max:71    PULSE OXIMETRY RANGE: SpO2  Av.5 %  Min: 92 %  Max: 97 %  24HR INTAKE/OUTPUT:      Intake/Output Summary (Last 24 hours) at 2019 1215  Last data filed at 2019 0933  Gross per 24 hour   Intake 360 ml   Output 3725 ml   Net -3365 ml       Exam:      General appearance: No apparent distress, appears stated age and cooperative.   Lungs: improved air entry as bases today  Heart: Regular rate and rhythm with Normal S1/S2 without  murmurs, rubs or
133/63   Pulse 77   Temp 98.2 °F (36.8 °C) (Oral)   Resp 16   Ht 5' 1\" (1.549 m)   Wt (!) 323 lb 3.2 oz (146.6 kg)   SpO2 95%   BMI 61.07 kg/m²       Intake/Output Summary (Last 24 hours) at 9/18/2019 1447  Last data filed at 9/18/2019 1244  Gross per 24 hour   Intake 360 ml   Output 5375 ml   Net -5015 ml     Wt Readings from Last 3 Encounters:   09/18/19 (!) 323 lb 3.2 oz (146.6 kg)       Physical Exam:  /63   Pulse 77   Temp 98.2 °F (36.8 °C) (Oral)   Resp 16   Ht 5' 1\" (1.549 m)   Wt (!) 323 lb 3.2 oz (146.6 kg)   SpO2 95%   BMI 61.07 kg/m²   BP Readings from Last 3 Encounters:   09/18/19 133/63   09/27/17 120/70     Pulse Readings from Last 3 Encounters:   09/18/19 77   09/27/17 71       Intake/Output Summary (Last 24 hours) at 9/18/2019 1447  Last data filed at 9/18/2019 1244  Gross per 24 hour   Intake 360 ml   Output 5375 ml   Net -5015 ml     Wt Readings from Last 2 Encounters:   09/18/19 (!) 323 lb 3.2 oz (146.6 kg)     Constitutional: She is oriented to person, place, and time. She appears well-developed and well-nourished. In no acute distress. Head: Normocephalic and atraumatic. Eyes: PEERL   Neck: Neck supple. No JVD present. Carotid bruit is not present. No mass and no thyromegaly present. No lymphadenopathy present. Cardiovascular: Normal rate, regular rhythm, normal heart sounds and intact distal pulses. Exam reveals no gallop and no friction rub. No murmur heard. Pulmonary/Chest: Effort normal and breath sounds normal. No respiratory distress. She has no wheezes, rhonchi or rales. Abdominal: Soft, non-tender. Bowel sounds and aorta are normal. She exhibits no organomegaly, mass or bruit. Extremities: No edema, cyanosis, or clubbing. Pulses are 2+ radial/carotid/dorsalis pedis and posterior tibial bilaterally. Neurological: She is alert and oriented to person, place, and time. She has normal reflexes. No cranial nerve deficit.  Coordination normal.   Skin: Skin is
edema is much better. Her vitals are stable. The echocardiograph is noted with ejection fraction of 55 to 60%. Lungs are clear. We will continue diuresis. I think she is about ready to be discharged home from our perspective. I do not have a good clear reason for her retaining the fluid that she retained and hopefully it is all related to a postsurgical findings. Cong Swanson MD, [facility]

## 2019-09-20 ENCOUNTER — CARE COORDINATION (OUTPATIENT)
Dept: CASE MANAGEMENT | Age: 69
End: 2019-09-20

## 2019-09-20 DIAGNOSIS — I50.9 CONGESTIVE HEART FAILURE, UNSPECIFIED HF CHRONICITY, UNSPECIFIED HEART FAILURE TYPE (HCC): Primary | ICD-10-CM

## 2019-09-20 PROCEDURE — 1111F DSCHRG MED/CURRENT MED MERGE: CPT | Performed by: FAMILY MEDICINE

## 2019-09-20 NOTE — CARE COORDINATION
instructed to report a 3 lb weight gain in overnight, or a 5 lb weight gain in a week. Patient states she has called and scheduled follow up appointment with PCP from [facility] for next week. CTN advised Pt of use of urgent care or physicians 24 hr access line if assistance is needed after hours or CTN weekend. CTN provided education on s/s that require medical attention and when to seek medical attention. Pt denies any needs or concerns and is agreeable with additional calls.     Follow Up  Future Appointments   Date Time Provider Mario Alberto Zaidi   9/26/2019 10:30 AM LUCIANO Hernandez - CNP [facility] SCOTT Rivera RN

## 2019-09-24 ENCOUNTER — CARE COORDINATION (OUTPATIENT)
Dept: CASE MANAGEMENT | Age: 69
End: 2019-09-24

## 2019-09-24 NOTE — CARE COORDINATION
[facility] Transitions Follow Up Call    2019    Patient: Raul Shay  Patient : 1950   MRN: [id number]  Reason for Admission: CHF  Discharge Date: 19 RARS: Readmission Risk Score: 10         Spoke with: Attempted to contact patient for follow up [facility]-A transition call. Left message on Voice mail to call office (number given) with any questions and an update on your condition since discharge. Will Follow up at later time. Care Transitions Subsequent and Final Call    Subsequent and Final Calls  Care Transitions Interventions  Other Interventions:             Follow Up  Future Appointments   Date Time Provider Mario Alberto Zaidi   2019 10:30 AM LUCIANO Kat - CNP [facility] SCOTT Holden LPN

## 2019-09-26 ENCOUNTER — OFFICE VISIT (OUTPATIENT)
Dept: CARDIOLOGY CLINIC | Age: 69
End: 2019-09-26
Payer: MEDICARE

## 2019-09-26 VITALS
BODY MASS INDEX: 61.14 KG/M2 | DIASTOLIC BLOOD PRESSURE: 66 MMHG | WEIGHT: 293 LBS | SYSTOLIC BLOOD PRESSURE: 110 MMHG | HEART RATE: 76 BPM

## 2019-09-26 DIAGNOSIS — I50.32 CHRONIC DIASTOLIC CONGESTIVE HEART FAILURE (HCC): Primary | ICD-10-CM

## 2019-09-26 PROCEDURE — 1090F PRES/ABSN URINE INCON ASSESS: CPT | Performed by: NURSE PRACTITIONER

## 2019-09-26 PROCEDURE — 3017F COLORECTAL CA SCREEN DOC REV: CPT | Performed by: NURSE PRACTITIONER

## 2019-09-26 PROCEDURE — G8427 DOCREV CUR MEDS BY ELIG CLIN: HCPCS | Performed by: NURSE PRACTITIONER

## 2019-09-26 PROCEDURE — G8417 CALC BMI ABV UP PARAM F/U: HCPCS | Performed by: NURSE PRACTITIONER

## 2019-09-26 PROCEDURE — G8400 PT W/DXA NO RESULTS DOC: HCPCS | Performed by: NURSE PRACTITIONER

## 2019-09-26 PROCEDURE — 1036F TOBACCO NON-USER: CPT | Performed by: NURSE PRACTITIONER

## 2019-09-26 PROCEDURE — 4040F PNEUMOC VAC/ADMIN/RCVD: CPT | Performed by: NURSE PRACTITIONER

## 2019-09-26 PROCEDURE — 1123F ACP DISCUSS/DSCN MKR DOCD: CPT | Performed by: NURSE PRACTITIONER

## 2019-09-26 PROCEDURE — 99213 OFFICE O/P EST LOW 20 MIN: CPT | Performed by: NURSE PRACTITIONER

## 2019-09-26 PROCEDURE — 1111F DSCHRG MED/CURRENT MED MERGE: CPT | Performed by: NURSE PRACTITIONER

## 2019-09-26 NOTE — PATIENT INSTRUCTIONS
HFpEF  /  improved /on lasix    Fu in approx 6 weeks after returns from [facility]    To ENT if can make appt before leaving for vacation

## 2019-10-11 ENCOUNTER — CARE COORDINATION (OUTPATIENT)
Dept: CASE MANAGEMENT | Age: 69
End: 2019-10-11

## 2019-10-18 ENCOUNTER — CARE COORDINATION (OUTPATIENT)
Dept: CASE MANAGEMENT | Age: 69
End: 2019-10-18

## 2019-10-25 ENCOUNTER — CARE COORDINATION (OUTPATIENT)
Dept: CASE MANAGEMENT | Age: 69
End: 2019-10-25

## 2019-11-08 ENCOUNTER — CARE COORDINATION (OUTPATIENT)
Dept: CASE MANAGEMENT | Age: 69
End: 2019-11-08

## 2019-11-13 ENCOUNTER — CARE COORDINATION (OUTPATIENT)
Dept: CASE MANAGEMENT | Age: 69
End: 2019-11-13

## 2019-11-20 ENCOUNTER — CARE COORDINATION (OUTPATIENT)
Dept: CASE MANAGEMENT | Age: 69
End: 2019-11-20

## 2019-12-05 ENCOUNTER — CARE COORDINATION (OUTPATIENT)
Dept: CASE MANAGEMENT | Age: 69
End: 2019-12-05

## 2020-04-07 ENCOUNTER — NURSE TRIAGE (OUTPATIENT)
Dept: OTHER | Facility: CLINIC | Age: 70
End: 2020-04-07

## 2020-09-09 ENCOUNTER — OFFICE VISIT (OUTPATIENT)
Dept: CARDIOLOGY CLINIC | Age: 70
End: 2020-09-09
Payer: MEDICARE

## 2020-09-09 VITALS
TEMPERATURE: 97.7 F | SYSTOLIC BLOOD PRESSURE: 130 MMHG | HEART RATE: 78 BPM | BODY MASS INDEX: 61.37 KG/M2 | DIASTOLIC BLOOD PRESSURE: 80 MMHG | WEIGHT: 293 LBS

## 2020-09-09 PROCEDURE — G8400 PT W/DXA NO RESULTS DOC: HCPCS | Performed by: NURSE PRACTITIONER

## 2020-09-09 PROCEDURE — 1036F TOBACCO NON-USER: CPT | Performed by: NURSE PRACTITIONER

## 2020-09-09 PROCEDURE — G8417 CALC BMI ABV UP PARAM F/U: HCPCS | Performed by: NURSE PRACTITIONER

## 2020-09-09 PROCEDURE — G8427 DOCREV CUR MEDS BY ELIG CLIN: HCPCS | Performed by: NURSE PRACTITIONER

## 2020-09-09 PROCEDURE — 1090F PRES/ABSN URINE INCON ASSESS: CPT | Performed by: NURSE PRACTITIONER

## 2020-09-09 PROCEDURE — 1123F ACP DISCUSS/DSCN MKR DOCD: CPT | Performed by: NURSE PRACTITIONER

## 2020-09-09 PROCEDURE — 93000 ELECTROCARDIOGRAM COMPLETE: CPT | Performed by: NURSE PRACTITIONER

## 2020-09-09 PROCEDURE — 4040F PNEUMOC VAC/ADMIN/RCVD: CPT | Performed by: NURSE PRACTITIONER

## 2020-09-09 PROCEDURE — 0296T PR EXT ECG > 48HR TO 21 DAY RCRD W/CONECT INTL RCRD: CPT | Performed by: INTERNAL MEDICINE

## 2020-09-09 PROCEDURE — 3017F COLORECTAL CA SCREEN DOC REV: CPT | Performed by: NURSE PRACTITIONER

## 2020-09-09 PROCEDURE — 99213 OFFICE O/P EST LOW 20 MIN: CPT | Performed by: NURSE PRACTITIONER

## 2020-09-09 NOTE — PROGRESS NOTES
Denies anxiety, insomnia or depression     Past Medical History        Past Medical History:   Diagnosis Date    DVT (deep venous thrombosis) (HCC)      Hypertension      Lupus anticoagulant disorder (HCC)      PE (pulmonary thromboembolism) (HCC)               /66   Pulse 76   Wt (!) 323 lb 9.6 oz (146.8 kg)   BMI 61.14 kg/m²       BP Readings from Last 3 Encounters:   09/26/19 110/66   09/19/19 (!) 130/57   09/27/17 120/70         Pulse Readings from Last 3 Encounters:   09/26/19 76   09/19/19 84   09/27/17 71         Wt Readings from Last 3 Encounters:   09/26/19 (!) 323 lb 9.6 oz (146.8 kg)   09/19/19 (!) 317 lb 11.2 oz (144.1 kg)     Constitutional: She is oriented to person, place, and time. She appears well-developed and well-nourished. In no acute distress. HEENT: Normocephalic and atraumatic. Sclerae anicteric. No xanthelasmas. Conjunctiva white, no subconjunctival hemorrhage   External inspection of ears nose teeth & gums   Eyes:PERRLA EOM's intact. Neck: Neck supple. No JVD present. Carotids without bruits. No mass and no thyromegaly present. No lymphadenopathy present. Cardiovascular: RRR, normal S1 and S2; no murmur/gallop or rub, PMI nondisplaced  Pulmonary/Chest: Effort normal.  Lungs clear to auscultation. Chest wall nontender  Abdominal: soft, nontender, nondistended. + bowel sounds; no organomegaly or bruits. Aorta normal  Extremities: No edema, cyanosis, or clubbing. Pulses are 2+ radial/carotid/dorsalis pedis bilaterally. Cap refill brisk. Neurological: No cranial nerve deficit. Psychiatric: She has a normal mood and affect.  Her speech is normal and behavior is normal.      Lab Review:         Lab Results   Component Value Date     TRIG 189 09/18/2019     HDL 51 09/18/2019     LDLCALC 64 09/18/2019     LABVLDL 38 09/18/2019           Lab Results   Component Value Date      09/19/2019     K 3.4 09/19/2019     K 4.4 09/17/2019     CL 93 09/19/2019     CO2 36 09/19/2019   BUN 20 09/19/2019     CREATININE 0.9 09/19/2019     GLUCOSE 124 09/19/2019     CALCIUM 9.9 09/19/2019            Lab Results   Component Value Date     WBC 7.0 09/17/2019     HGB 11.4 (L) 09/17/2019     HCT 34.4 (L) 09/17/2019     MCV 91.3 09/17/2019      09/17/2019           I have reviewed any available labs, images, any stress test, C on this pt           Assessment:     HFpEF better compensated today   Today up 6# since in hosp on 9/19/19 / she does not appears to be in heart failure today  No c/o cp / no c/o unusual SOB/ on lasix 40mg daily  / legs large but no pitting edema noted / lungs clear   She had parathyroid removed recently / she c/o weak voice and her ears bothering her  Will try to get her into ENT office soon / Gregg Mosher        Echo 9/18/19 Definity contrast administered.   Left ventricular cavity size is normal with normal left ventricular wall   thickness.   Overall left ventricular systolic function appears normal with an estimated   ejection fraction of 55-60%.    Diastolic function is indeterminate.   Estimated pulmonary artery systolic pressure is at 26 mmHg assuming a right   atrial pressure of 3 mmHg.   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     Hx subdural hematoma / stable    States hs to take coumadin not allowed to take XA inhibitors pe hemoc     Hyperthyroidism    TSH 0.20   T4 2.0   Try to get in to see ENT soon      Plan:   HFpEF  /  improved /on lasix    Fu in approx 6 weeks after returns from [facility]    To ENT if can make appt before leaving for vacation

## 2020-09-21 ENCOUNTER — HOSPITAL ENCOUNTER (OUTPATIENT)
Dept: NON INVASIVE DIAGNOSTICS | Age: 70
Discharge: HOME | End: 2020-09-21
Payer: MEDICARE

## 2020-09-21 LAB
LV EF: 69 %
LVEF MODALITY: NORMAL

## 2020-09-21 PROCEDURE — 3430000000 HC RX DIAGNOSTIC RADIOPHARMACEUTICAL: Performed by: NURSE PRACTITIONER

## 2020-09-21 PROCEDURE — A9502 TC99M TETROFOSMIN: HCPCS | Performed by: NURSE PRACTITIONER

## 2020-09-21 PROCEDURE — 78452 HT MUSCLE IMAGE SPECT MULT: CPT

## 2020-09-21 PROCEDURE — 2580000003 HC RX 258: Performed by: NURSE PRACTITIONER

## 2020-09-21 PROCEDURE — 6360000002 HC RX W HCPCS: Performed by: NURSE PRACTITIONER

## 2020-09-21 PROCEDURE — 93017 CV STRESS TEST TRACING ONLY: CPT

## 2020-09-21 RX ORDER — SODIUM CHLORIDE 0.9 % (FLUSH) 0.9 %
10 SYRINGE (ML) INJECTION PRN
Status: DISCONTINUED | OUTPATIENT
Start: 2020-09-21 | End: 2020-09-22 | Stop reason: HOSPADM

## 2020-09-21 RX ADMIN — Medication 10 ML: at 11:41

## 2020-09-21 RX ADMIN — TETROFOSMIN 36 MILLICURIE: 1.38 INJECTION, POWDER, LYOPHILIZED, FOR SOLUTION INTRAVENOUS at 11:41

## 2020-09-21 RX ADMIN — REGADENOSON 0.4 MG: 0.08 INJECTION, SOLUTION INTRAVENOUS at 11:41

## 2020-09-22 ENCOUNTER — HOSPITAL ENCOUNTER (OUTPATIENT)
Dept: NON INVASIVE DIAGNOSTICS | Age: 70
Discharge: HOME | End: 2020-09-22
Payer: MEDICARE

## 2020-09-22 PROCEDURE — A9502 TC99M TETROFOSMIN: HCPCS | Performed by: NURSE PRACTITIONER

## 2020-09-22 PROCEDURE — 3430000000 HC RX DIAGNOSTIC RADIOPHARMACEUTICAL: Performed by: NURSE PRACTITIONER

## 2020-09-22 PROCEDURE — 78452 HT MUSCLE IMAGE SPECT MULT: CPT

## 2020-09-22 RX ADMIN — TETROFOSMIN 30 MILLICURIE: 1.38 INJECTION, POWDER, LYOPHILIZED, FOR SOLUTION INTRAVENOUS at 10:29

## 2020-09-28 PROCEDURE — 0298T PR EXT ECG > 48HR TO 21 DAY REVIEW AND INTERPRETATN: CPT | Performed by: INTERNAL MEDICINE

## 2020-09-29 ENCOUNTER — OFFICE VISIT (OUTPATIENT)
Dept: CARDIOLOGY CLINIC | Age: 70
End: 2020-09-29
Payer: MEDICARE

## 2020-09-29 VITALS
BODY MASS INDEX: 61.94 KG/M2 | SYSTOLIC BLOOD PRESSURE: 120 MMHG | WEIGHT: 293 LBS | DIASTOLIC BLOOD PRESSURE: 70 MMHG | HEART RATE: 80 BPM | TEMPERATURE: 97.3 F

## 2020-09-29 PROCEDURE — 1123F ACP DISCUSS/DSCN MKR DOCD: CPT | Performed by: INTERNAL MEDICINE

## 2020-09-29 PROCEDURE — G8427 DOCREV CUR MEDS BY ELIG CLIN: HCPCS | Performed by: INTERNAL MEDICINE

## 2020-09-29 PROCEDURE — 99214 OFFICE O/P EST MOD 30 MIN: CPT | Performed by: INTERNAL MEDICINE

## 2020-09-29 PROCEDURE — 4040F PNEUMOC VAC/ADMIN/RCVD: CPT | Performed by: INTERNAL MEDICINE

## 2020-09-29 PROCEDURE — 1036F TOBACCO NON-USER: CPT | Performed by: INTERNAL MEDICINE

## 2020-09-29 PROCEDURE — G8417 CALC BMI ABV UP PARAM F/U: HCPCS | Performed by: INTERNAL MEDICINE

## 2020-09-29 PROCEDURE — 3017F COLORECTAL CA SCREEN DOC REV: CPT | Performed by: INTERNAL MEDICINE

## 2020-09-29 PROCEDURE — G8400 PT W/DXA NO RESULTS DOC: HCPCS | Performed by: INTERNAL MEDICINE

## 2020-09-29 PROCEDURE — 1090F PRES/ABSN URINE INCON ASSESS: CPT | Performed by: INTERNAL MEDICINE

## 2020-09-29 RX ORDER — LISINOPRIL AND HYDROCHLOROTHIAZIDE 20; 12.5 MG/1; MG/1
1 TABLET ORAL DAILY
COMMUNITY
Start: 2020-09-04

## 2020-09-29 RX ORDER — PREDNISOLONE ACETATE 10 MG/ML
SUSPENSION/ DROPS OPHTHALMIC
COMMUNITY
Start: 2020-09-15 | End: 2022-01-06 | Stop reason: CLARIF

## 2020-09-29 NOTE — PROGRESS NOTES
[address]   Dr Dax Watt. Ashutosh Ashby MD, [address]    Outpatient Follow Up Note    9/29/2020,12:14 PM  Subjective:   CHIEF COMPLAINT / HPI:  Follow Up secondary to retention DVTBlaise Royal is 78 y.o. female who presents today for a routine follow up. He is generally doing well. She is still fairly large at 327 pounds with a BMI of 61.9. She did have recent dental care and had to have surgery. This is apparently for 5 time that she has had surgery on the left eye. No trauma. She feels generally better and having no current chest pains or shortness of breath. She is tolerating her medications very well. She does have a history of DVT lupus anticoagulant disorder and history of pulmonary emboli. The extremities do not show edema although she is a very large moderately to severely obese. I did review her ZIO monitor that was performed in September for 13 days and 2 hours. She had occasional PACs 3 beat run of SVT rare PVC. She states that 2 days after she started wearing the monitor all of her arrhythmias and palpitations went away completely and she is tolerating her current therapy and she is particularly happy. Past Medical History:    Past Medical History:   Diagnosis Date    DVT (deep venous thrombosis) (HCC)     Hypertension     Lupus anticoagulant disorder ([address]     PE [facility]ulmonary [facility]embolism) [facility])      Past Surgical History  Past Surgical History:   Procedure Laterality Date    APPENDECTOMY      BRAIN SURGERY      CHOLECYSTECTOMY      FOOT SURGERY      TONSILLECTOMY       Social History:       Social History     Tobacco Use   Smoking Status Never Smoker   Smokeless Tobacco Never Used     Current Medications:  Prior to Visit Medications    Medication Sig Taking?  Authorizing Provider   prednisoLONE acetate (PRED FORTE) 1 % ophthalmic suspension SHAKE LQ AND INT 1 GTT IN OS QID Yes Historical Provider, MD   lisinopril-hydroCHLOROthiazide extended release tablet Take 1 tablet by mouth daily Take together with lasix (Patient taking differently: Take 20 mEq by mouth daily ) 30 tablet 3    calcium 600 MG TABS tablet Take 2,400 mg by mouth 3 times daily       warfarin (COUMADIN) 5 MG tablet Take 5 mg by mouth every evening       HYDROcodone-acetaminophen (NORCO) 5-325 MG per tablet Take 1 tablet by mouth every 6 hours as needed for Pain .  ALPRAZolam (XANAX) 0.5 MG tablet Take 0.5 mg by mouth nightly as needed for Sleep      gabapentin (NEURONTIN) 400 MG capsule Take 400 mg by mouth 2 times daily.  valsartan-hydrochlorothiazide (DIOVAN HCT) 160-12.5 MG per tablet Take 1 tablet by mouth daily      rOPINIRole (REQUIP) 2 MG tablet Take 2 mg by mouth 3 times daily as needed       cyclobenzaprine (FLEXERIL) 10 MG tablet Take 10 mg by mouth 3 times daily as needed for Muscle spasms      cycloSPORINE (RESTASIS MULTIDOSE) 0.05 % ophthalmic emulsion Place 1 drop into both eyes 2 times daily      zolpidem (AMBIEN) 5 MG tablet Take 5 mg by mouth nightly as needed for Sleep       No current facility-administered medications for this visit. REVIEW OF SYSTEMS:    CONSTITUTIONAL: No major weight gain or loss, fatigue, weakness, night sweats or fever. HEENT: No new vision difficulties or ringing in the ears. RESPIRATORY: No new SOB, PND, orthopnea or cough. CARDIOVASCULAR: See HPI  GI: No nausea, vomiting, diarrhea, constipation, abdominal pain or changes in bowel habits. : No urinary frequency, urgency, incontinence hematuria or dysuria. SKIN: No cyanosis or skin lesions. MUSCULOSKELETAL: No new muscle or joint pain. NEUROLOGICAL: No syncope or TIA-like symptoms.   PSYCHIATRIC: No anxiety, pain, insomnia or depression    Objective:   PHYSICAL EXAM:        VITALS:    Wt Readings from Last 3 Encounters:   09/29/20 (!) 327 lb 12.8 oz (148.7 kg)   09/09/20 (!) 324 lb 12.8 oz (147.3 kg)   09/26/19 (!) 323 lb 9.6 oz (146.8 kg)     BP Readings from Last 3 Encounters:   09/29/20 120/70   09/09/20 130/80   09/26/19 110/66     Pulse Readings from Last 3 Encounters:   09/29/20 80   09/09/20 78   09/26/19 76       CONSTITUTIONAL: Cooperative, no apparent distress, and appears well nourished / developed  NEUROLOGIC:  Awake and orientated to person, place and time. PSYCH: Calm affect. SKIN: Warm and dry. HEENT: Sclera non-icteric, normocephalic, neck supple, no elevation of JVP, normal carotid pulses with no bruits and thyroid normal size. LUNGS:  No increased work of breathing and clear to auscultation, no crackles or wheezing  CARDIOVASCULAR:  Regular rate and rhythm with no murmurs, gallops, rubs, or abnormal heart sounds, normal PMI. The apical impulses not displaced  Heart tones are crisp and normal  Cervical veins are not engorged  The carotid upstroke is normal in amplitude and contour without delay or bruit  JVP is not elevated  ABDOMEN:  Normal bowel sounds, non-distended and non-tender to palpation  EXT: No edema, no calf tenderness. Pulses are present bilaterally.     DATA:    Lab Results   Component Value Date    ALT 13 09/17/2019    AST 15 09/17/2019    ALKPHOS 73 09/17/2019    BILITOT 1.0 09/17/2019     Lab Results   Component Value Date    CREATININE 0.9 09/19/2019    BUN 20 09/19/2019     09/19/2019    K 3.4 (L) 09/19/2019    CL 93 (L) 09/19/2019    CO2 36 (H) 09/19/2019     No results found for: TSH, X8WCAUT, M9YIPUT, THYROIDAB  Lab Results   Component Value Date    WBC 7.0 09/17/2019    HGB 11.4 (L) 09/17/2019    HCT 34.4 (L) 09/17/2019    MCV 91.3 09/17/2019     09/17/2019     No components found for: CHLPL  Lab Results   Component Value Date    TRIG 189 (H) 09/18/2019     Lab Results   Component Value Date    HDL 51 09/18/2019     Lab Results   Component Value Date    LDLCALC 64 09/18/2019     Lab Results   Component Value Date    LABVLDL 38 09/18/2019     Radiology Review:  Pertinent images / reports were reviewed as a part of this visit and reveals the following:    Last Echo:  Summary9/18/19   Technically difficult study. Definity contrast administered. Left ventricular cavity size is normal with normal left ventricular wall   thickness. Overall left ventricular systolic function appears normal with an estimated   ejection fraction of 55-60%. Diastolic function is indeterminate. Estimated pulmonary artery systolic pressure is at 26 mmHg assuming a right   atrial pressure of 3 mmHg. No evidence of significant valvular stenosis or regurgitation       Last Stress Test / Angiogram:  Summary 9/21/20   Charlynne Plane is symmetric isotope uptake at stress and rest. There is no evidence     of myocardial ischemia or scar.     The LVEF is 69% with normal L V wall motion.          This is a low risk cardiac scan.         Stress Protocols        Last ECG:  This patient was educated using the patient point room wall mount device. Absence from smokers and smoking and diet and exercising are important. Assessment:   DVT hypertension hyperlipidemia Lupus anticoagulant. Plan:   Continue current therapy. Continue to recommend to her the weight loss weight reduction. All else remained stable at this time. Return to see me in 6 months. Please call if we can assist further [phone]. Adonis Mervat Nunez MD, [facility]      This note was likely completed using voice recognition technology and may contain unintended errors

## 2022-01-06 ENCOUNTER — APPOINTMENT (OUTPATIENT)
Dept: GENERAL RADIOLOGY | Age: 72
DRG: 693 | End: 2022-01-06
Payer: MEDICARE

## 2022-01-06 ENCOUNTER — HOSPITAL ENCOUNTER (INPATIENT)
Age: 72
LOS: 2 days | Discharge: HOME | DRG: 693 | End: 2022-01-08
Attending: EMERGENCY MEDICINE | Admitting: INTERNAL MEDICINE
Payer: MEDICARE

## 2022-01-06 ENCOUNTER — APPOINTMENT (OUTPATIENT)
Dept: CT IMAGING | Age: 72
DRG: 693 | End: 2022-01-06
Payer: MEDICARE

## 2022-01-06 DIAGNOSIS — N20.0 NEPHROLITHIASIS: Primary | ICD-10-CM

## 2022-01-06 PROBLEM — R10.9 ABDOMINAL PAIN: Status: ACTIVE | Noted: 2022-01-06

## 2022-01-06 LAB
A/G RATIO: 1.3 (ref 1.1–2.2)
ALBUMIN SERPL-MCNC: 4 G/DL (ref 3.4–5)
ALP BLD-CCNC: 77 U/L (ref 40–129)
ALT SERPL-CCNC: 18 U/L (ref 10–40)
ANION GAP SERPL CALCULATED.3IONS-SCNC: 8 MMOL/L (ref 3–16)
AST SERPL-CCNC: 23 U/L (ref 15–37)
BACTERIA: ABNORMAL /HPF
BASOPHILS ABSOLUTE: 0.1 K/UL (ref 0–0.2)
BASOPHILS RELATIVE PERCENT: 0.5 %
BILIRUB SERPL-MCNC: 2.5 MG/DL (ref 0–1)
BILIRUBIN URINE: NEGATIVE
BLOOD, URINE: ABNORMAL
BUN BLDV-MCNC: 19 MG/DL (ref 7–20)
CALCIUM SERPL-MCNC: 8.4 MG/DL (ref 8.3–10.6)
CHLORIDE BLD-SCNC: 98 MMOL/L (ref 99–110)
CLARITY: CLEAR
CO2: 29 MMOL/L (ref 21–32)
COLOR: YELLOW
CREAT SERPL-MCNC: 1.3 MG/DL (ref 0.6–1.2)
EKG ATRIAL RATE: 83 BPM
EKG DIAGNOSIS: NORMAL
EKG P AXIS: 37 DEGREES
EKG P-R INTERVAL: 194 MS
EKG Q-T INTERVAL: 374 MS
EKG QRS DURATION: 90 MS
EKG QTC CALCULATION (BAZETT): 439 MS
EKG R AXIS: 40 DEGREES
EKG T AXIS: 8 DEGREES
EKG VENTRICULAR RATE: 83 BPM
EOSINOPHILS ABSOLUTE: 0 K/UL (ref 0–0.6)
EOSINOPHILS RELATIVE PERCENT: 0.3 %
EPITHELIAL CELLS, UA: ABNORMAL /HPF (ref 0–5)
GFR AFRICAN AMERICAN: 49
GFR NON-AFRICAN AMERICAN: 41
GLUCOSE BLD-MCNC: 125 MG/DL (ref 70–99)
GLUCOSE URINE: NEGATIVE MG/DL
HCT VFR BLD CALC: 37.5 % (ref 36–48)
HEMOGLOBIN: 12.7 G/DL (ref 12–16)
INR BLD: 2.6 (ref 0.88–1.12)
KETONES, URINE: NEGATIVE MG/DL
LACTIC ACID: 1.3 MMOL/L (ref 0.4–2)
LEUKOCYTE ESTERASE, URINE: NEGATIVE
LIPASE: 23 U/L (ref 13–60)
LYMPHOCYTES ABSOLUTE: 0.6 K/UL (ref 1–5.1)
LYMPHOCYTES RELATIVE PERCENT: 4.8 %
MCH RBC QN AUTO: 31.8 PG (ref 26–34)
MCHC RBC AUTO-ENTMCNC: 33.8 G/DL (ref 31–36)
MCV RBC AUTO: 93.9 FL (ref 80–100)
MICROSCOPIC EXAMINATION: YES
MONOCYTES ABSOLUTE: 0.7 K/UL (ref 0–1.3)
MONOCYTES RELATIVE PERCENT: 5.5 %
NEUTROPHILS ABSOLUTE: 11 K/UL (ref 1.7–7.7)
NEUTROPHILS RELATIVE PERCENT: 88.9 %
NITRITE, URINE: NEGATIVE
PDW BLD-RTO: 14.4 % (ref 12.4–15.4)
PH UA: 5.5 (ref 5–8)
PLATELET # BLD: 253 K/UL (ref 135–450)
PMV BLD AUTO: 8.2 FL (ref 5–10.5)
POTASSIUM REFLEX MAGNESIUM: 3.9 MMOL/L (ref 3.5–5.1)
PRO-BNP: 3041 PG/ML (ref 0–124)
PROTEIN UA: ABNORMAL MG/DL
PROTHROMBIN TIME: 30.6 SEC (ref 9.9–12.7)
RAPID INFLUENZA  B AGN: NEGATIVE
RAPID INFLUENZA A AGN: NEGATIVE
RBC # BLD: 4 M/UL (ref 4–5.2)
RBC UA: ABNORMAL /HPF (ref 0–4)
SODIUM BLD-SCNC: 135 MMOL/L (ref 136–145)
SPECIFIC GRAVITY UA: 1.02 (ref 1–1.03)
TOTAL PROTEIN: 7.1 G/DL (ref 6.4–8.2)
TROPONIN: <0.01 NG/ML
URINE REFLEX TO CULTURE: ABNORMAL
URINE TYPE: ABNORMAL
UROBILINOGEN, URINE: 0.2 E.U./DL
WBC # BLD: 12.3 K/UL (ref 4–11)
WBC UA: ABNORMAL /HPF (ref 0–5)

## 2022-01-06 PROCEDURE — 36415 COLL VENOUS BLD VENIPUNCTURE: CPT

## 2022-01-06 PROCEDURE — 93005 ELECTROCARDIOGRAM TRACING: CPT | Performed by: EMERGENCY MEDICINE

## 2022-01-06 PROCEDURE — 83880 ASSAY OF NATRIURETIC PEPTIDE: CPT

## 2022-01-06 PROCEDURE — 83605 ASSAY OF LACTIC ACID: CPT

## 2022-01-06 PROCEDURE — 6370000000 HC RX 637 (ALT 250 FOR IP): Performed by: INTERNAL MEDICINE

## 2022-01-06 PROCEDURE — 87804 INFLUENZA ASSAY W/OPTIC: CPT

## 2022-01-06 PROCEDURE — 81001 URINALYSIS AUTO W/SCOPE: CPT

## 2022-01-06 PROCEDURE — U0003 INFECTIOUS AGENT DETECTION BY NUCLEIC ACID (DNA OR RNA); SEVERE ACUTE RESPIRATORY SYNDROME CORONAVIRUS 2 (SARS-COV-2) (CORONAVIRUS DISEASE [COVID-19]), AMPLIFIED PROBE TECHNIQUE, MAKING USE OF HIGH THROUGHPUT TECHNOLOGIES AS DESCRIBED BY CMS-2020-01-R: HCPCS

## 2022-01-06 PROCEDURE — 84484 ASSAY OF TROPONIN QUANT: CPT

## 2022-01-06 PROCEDURE — 96374 THER/PROPH/DIAG INJ IV PUSH: CPT

## 2022-01-06 PROCEDURE — U0005 INFEC AGEN DETEC AMPLI PROBE: HCPCS

## 2022-01-06 PROCEDURE — 6360000002 HC RX W HCPCS: Performed by: STUDENT IN AN ORGANIZED HEALTH CARE EDUCATION/TRAINING PROGRAM

## 2022-01-06 PROCEDURE — 99284 EMERGENCY DEPT VISIT MOD MDM: CPT

## 2022-01-06 PROCEDURE — 6360000002 HC RX W HCPCS: Performed by: INTERNAL MEDICINE

## 2022-01-06 PROCEDURE — 74177 CT ABD & PELVIS W/CONTRAST: CPT

## 2022-01-06 PROCEDURE — 1200000000 HC SEMI PRIVATE

## 2022-01-06 PROCEDURE — 85025 COMPLETE CBC W/AUTO DIFF WBC: CPT

## 2022-01-06 PROCEDURE — 85610 PROTHROMBIN TIME: CPT

## 2022-01-06 PROCEDURE — 6360000004 HC RX CONTRAST MEDICATION: Performed by: STUDENT IN AN ORGANIZED HEALTH CARE EDUCATION/TRAINING PROGRAM

## 2022-01-06 PROCEDURE — 96375 TX/PRO/DX INJ NEW DRUG ADDON: CPT

## 2022-01-06 PROCEDURE — 6370000000 HC RX 637 (ALT 250 FOR IP): Performed by: STUDENT IN AN ORGANIZED HEALTH CARE EDUCATION/TRAINING PROGRAM

## 2022-01-06 PROCEDURE — 87040 BLOOD CULTURE FOR BACTERIA: CPT

## 2022-01-06 PROCEDURE — 2580000003 HC RX 258: Performed by: INTERNAL MEDICINE

## 2022-01-06 PROCEDURE — 2580000003 HC RX 258: Performed by: STUDENT IN AN ORGANIZED HEALTH CARE EDUCATION/TRAINING PROGRAM

## 2022-01-06 PROCEDURE — 83690 ASSAY OF LIPASE: CPT

## 2022-01-06 PROCEDURE — 71045 X-RAY EXAM CHEST 1 VIEW: CPT

## 2022-01-06 PROCEDURE — 80053 COMPREHEN METABOLIC PANEL: CPT

## 2022-01-06 RX ORDER — SODIUM CHLORIDE, SODIUM LACTATE, POTASSIUM CHLORIDE, AND CALCIUM CHLORIDE .6; .31; .03; .02 G/100ML; G/100ML; G/100ML; G/100ML
500 INJECTION, SOLUTION INTRAVENOUS ONCE
Status: COMPLETED | OUTPATIENT
Start: 2022-01-06 | End: 2022-01-06

## 2022-01-06 RX ORDER — ROPINIROLE 2 MG/1
2 TABLET, FILM COATED ORAL NIGHTLY
Status: DISCONTINUED | OUTPATIENT
Start: 2022-01-06 | End: 2022-01-08 | Stop reason: HOSPADM

## 2022-01-06 RX ORDER — SODIUM CHLORIDE 0.9 % (FLUSH) 0.9 %
10 SYRINGE (ML) INJECTION PRN
Status: DISCONTINUED | OUTPATIENT
Start: 2022-01-06 | End: 2022-01-08 | Stop reason: HOSPADM

## 2022-01-06 RX ORDER — HYDROCODONE BITARTRATE AND ACETAMINOPHEN 5; 325 MG/1; MG/1
1 TABLET ORAL EVERY 6 HOURS PRN
Status: DISCONTINUED | OUTPATIENT
Start: 2022-01-06 | End: 2022-01-06

## 2022-01-06 RX ORDER — GABAPENTIN 400 MG/1
400 CAPSULE ORAL 2 TIMES DAILY
Status: DISCONTINUED | OUTPATIENT
Start: 2022-01-06 | End: 2022-01-08 | Stop reason: HOSPADM

## 2022-01-06 RX ORDER — ATORVASTATIN CALCIUM 20 MG/1
10 TABLET, FILM COATED ORAL DAILY
Status: DISCONTINUED | OUTPATIENT
Start: 2022-01-06 | End: 2022-01-08 | Stop reason: HOSPADM

## 2022-01-06 RX ORDER — DULOXETIN HYDROCHLORIDE 60 MG/1
60 CAPSULE, DELAYED RELEASE ORAL NIGHTLY
COMMUNITY

## 2022-01-06 RX ORDER — WARFARIN SODIUM 2.5 MG/1
2.5 TABLET ORAL
Status: DISCONTINUED | OUTPATIENT
Start: 2022-01-06 | End: 2022-01-06

## 2022-01-06 RX ORDER — ACETAMINOPHEN 325 MG/1
650 TABLET ORAL EVERY 6 HOURS PRN
Status: DISCONTINUED | OUTPATIENT
Start: 2022-01-06 | End: 2022-01-08 | Stop reason: HOSPADM

## 2022-01-06 RX ORDER — ROPINIROLE 2 MG/1
2 TABLET, FILM COATED ORAL DAILY PRN
Status: DISCONTINUED | OUTPATIENT
Start: 2022-01-06 | End: 2022-01-08 | Stop reason: HOSPADM

## 2022-01-06 RX ORDER — CALCIUM CARBONATE 500(1250)
500 TABLET ORAL DAILY
Status: DISCONTINUED | OUTPATIENT
Start: 2022-01-06 | End: 2022-01-08 | Stop reason: HOSPADM

## 2022-01-06 RX ORDER — MORPHINE SULFATE 4 MG/ML
4 INJECTION, SOLUTION INTRAMUSCULAR; INTRAVENOUS
Status: DISCONTINUED | OUTPATIENT
Start: 2022-01-06 | End: 2022-01-08 | Stop reason: HOSPADM

## 2022-01-06 RX ORDER — SENNA AND DOCUSATE SODIUM 50; 8.6 MG/1; MG/1
1 TABLET, FILM COATED ORAL 2 TIMES DAILY
Status: DISCONTINUED | OUTPATIENT
Start: 2022-01-06 | End: 2022-01-08 | Stop reason: HOSPADM

## 2022-01-06 RX ORDER — ROPINIROLE 2 MG/1
2 TABLET, FILM COATED ORAL 3 TIMES DAILY
Status: DISCONTINUED | OUTPATIENT
Start: 2022-01-06 | End: 2022-01-06

## 2022-01-06 RX ORDER — SIMVASTATIN 20 MG
20 TABLET ORAL DAILY
COMMUNITY

## 2022-01-06 RX ORDER — ONDANSETRON 2 MG/ML
4 INJECTION INTRAMUSCULAR; INTRAVENOUS EVERY 6 HOURS PRN
Status: DISCONTINUED | OUTPATIENT
Start: 2022-01-06 | End: 2022-01-08 | Stop reason: HOSPADM

## 2022-01-06 RX ORDER — ACETAMINOPHEN 650 MG/1
650 SUPPOSITORY RECTAL EVERY 6 HOURS PRN
Status: DISCONTINUED | OUTPATIENT
Start: 2022-01-06 | End: 2022-01-08 | Stop reason: HOSPADM

## 2022-01-06 RX ORDER — POTASSIUM CHLORIDE 20 MEQ/1
20 TABLET, EXTENDED RELEASE ORAL DAILY
COMMUNITY

## 2022-01-06 RX ORDER — ONDANSETRON 4 MG/1
4 TABLET, FILM COATED ORAL EVERY 8 HOURS PRN
COMMUNITY

## 2022-01-06 RX ORDER — ONDANSETRON 4 MG/1
4 TABLET, ORALLY DISINTEGRATING ORAL EVERY 8 HOURS PRN
Status: DISCONTINUED | OUTPATIENT
Start: 2022-01-06 | End: 2022-01-08 | Stop reason: HOSPADM

## 2022-01-06 RX ORDER — SODIUM CHLORIDE 0.9 % (FLUSH) 0.9 %
10 SYRINGE (ML) INJECTION EVERY 12 HOURS SCHEDULED
Status: DISCONTINUED | OUTPATIENT
Start: 2022-01-06 | End: 2022-01-08 | Stop reason: HOSPADM

## 2022-01-06 RX ORDER — LANOLIN ALCOHOL/MO/W.PET/CERES
1000 CREAM (GRAM) TOPICAL DAILY
COMMUNITY

## 2022-01-06 RX ORDER — PROCHLORPERAZINE EDISYLATE 5 MG/ML
10 INJECTION INTRAMUSCULAR; INTRAVENOUS ONCE
Status: COMPLETED | OUTPATIENT
Start: 2022-01-06 | End: 2022-01-06

## 2022-01-06 RX ORDER — IBUPROFEN 200 MG
2400 CAPSULE ORAL 3 TIMES DAILY
Status: DISCONTINUED | OUTPATIENT
Start: 2022-01-06 | End: 2022-01-06

## 2022-01-06 RX ORDER — MORPHINE SULFATE 2 MG/ML
2 INJECTION, SOLUTION INTRAMUSCULAR; INTRAVENOUS
Status: DISCONTINUED | OUTPATIENT
Start: 2022-01-06 | End: 2022-01-08 | Stop reason: HOSPADM

## 2022-01-06 RX ORDER — HYDROCHLOROTHIAZIDE 25 MG/1
12.5 TABLET ORAL DAILY
Status: DISCONTINUED | OUTPATIENT
Start: 2022-01-06 | End: 2022-01-08 | Stop reason: HOSPADM

## 2022-01-06 RX ORDER — SODIUM CHLORIDE 9 MG/ML
25 INJECTION, SOLUTION INTRAVENOUS PRN
Status: DISCONTINUED | OUTPATIENT
Start: 2022-01-06 | End: 2022-01-08 | Stop reason: HOSPADM

## 2022-01-06 RX ORDER — NICOTINE POLACRILEX 2 MG
GUM BUCCAL DAILY
COMMUNITY

## 2022-01-06 RX ORDER — NIFEDIPINE 30 MG/1
30 TABLET, FILM COATED, EXTENDED RELEASE ORAL DAILY
COMMUNITY

## 2022-01-06 RX ORDER — DULOXETIN HYDROCHLORIDE 30 MG/1
60 CAPSULE, DELAYED RELEASE ORAL NIGHTLY
Status: DISCONTINUED | OUTPATIENT
Start: 2022-01-06 | End: 2022-01-08 | Stop reason: HOSPADM

## 2022-01-06 RX ORDER — POTASSIUM CHLORIDE 20 MEQ/1
20 TABLET, EXTENDED RELEASE ORAL DAILY
Status: DISCONTINUED | OUTPATIENT
Start: 2022-01-06 | End: 2022-01-08 | Stop reason: HOSPADM

## 2022-01-06 RX ORDER — IBUPROFEN 200 MG
1 CAPSULE ORAL DAILY
COMMUNITY

## 2022-01-06 RX ORDER — CYCLOBENZAPRINE HCL 10 MG
10 TABLET ORAL 3 TIMES DAILY PRN
Status: DISCONTINUED | OUTPATIENT
Start: 2022-01-06 | End: 2022-01-06

## 2022-01-06 RX ORDER — ACETAMINOPHEN 325 MG/1
650 TABLET ORAL ONCE
Status: COMPLETED | OUTPATIENT
Start: 2022-01-06 | End: 2022-01-06

## 2022-01-06 RX ORDER — NIFEDIPINE 30 MG/1
30 TABLET, FILM COATED, EXTENDED RELEASE ORAL DAILY
Status: DISCONTINUED | OUTPATIENT
Start: 2022-01-06 | End: 2022-01-08 | Stop reason: HOSPADM

## 2022-01-06 RX ADMIN — ATORVASTATIN CALCIUM 10 MG: 20 TABLET, FILM COATED ORAL at 21:21

## 2022-01-06 RX ADMIN — SODIUM CHLORIDE, PRESERVATIVE FREE 10 ML: 5 INJECTION INTRAVENOUS at 20:52

## 2022-01-06 RX ADMIN — ACETAMINOPHEN 650 MG: 325 TABLET ORAL at 21:22

## 2022-01-06 RX ADMIN — Medication 500 MG: at 21:21

## 2022-01-06 RX ADMIN — DULOXETINE HYDROCHLORIDE 60 MG: 30 CAPSULE, DELAYED RELEASE ORAL at 21:21

## 2022-01-06 RX ADMIN — ROPINIROLE 2 MG: 2 TABLET, FILM COATED ORAL at 21:22

## 2022-01-06 RX ADMIN — PROCHLORPERAZINE EDISYLATE 10 MG: 5 INJECTION INTRAMUSCULAR; INTRAVENOUS at 12:41

## 2022-01-06 RX ADMIN — POTASSIUM CHLORIDE 20 MEQ: 20 TABLET, EXTENDED RELEASE ORAL at 21:22

## 2022-01-06 RX ADMIN — SODIUM CHLORIDE, SODIUM LACTATE, POTASSIUM CHLORIDE, AND CALCIUM CHLORIDE 500 ML: .6; .31; .03; .02 INJECTION, SOLUTION INTRAVENOUS at 16:05

## 2022-01-06 RX ADMIN — SENNOSIDES AND DOCUSATE SODIUM 1 TABLET: 50; 8.6 TABLET ORAL at 21:22

## 2022-01-06 RX ADMIN — MORPHINE SULFATE 4 MG: 4 INJECTION, SOLUTION INTRAMUSCULAR; INTRAVENOUS at 23:05

## 2022-01-06 RX ADMIN — IOPAMIDOL 80 ML: 755 INJECTION, SOLUTION INTRAVENOUS at 12:12

## 2022-01-06 RX ADMIN — HYDROMORPHONE HYDROCHLORIDE 1 MG: 1 INJECTION, SOLUTION INTRAMUSCULAR; INTRAVENOUS; SUBCUTANEOUS at 12:41

## 2022-01-06 RX ADMIN — SODIUM CHLORIDE, POTASSIUM CHLORIDE, SODIUM LACTATE AND CALCIUM CHLORIDE 500 ML: 600; 310; 30; 20 INJECTION, SOLUTION INTRAVENOUS at 12:40

## 2022-01-06 RX ADMIN — ACETAMINOPHEN 650 MG: 325 TABLET ORAL at 13:24

## 2022-01-06 RX ADMIN — NIFEDIPINE 30 MG: 30 TABLET, FILM COATED, EXTENDED RELEASE ORAL at 21:22

## 2022-01-06 ASSESSMENT — PAIN DESCRIPTION - DESCRIPTORS: DESCRIPTORS: CONSTANT

## 2022-01-06 ASSESSMENT — PAIN DESCRIPTION - PAIN TYPE
TYPE: ACUTE PAIN
TYPE: ACUTE PAIN

## 2022-01-06 ASSESSMENT — PAIN DESCRIPTION - ORIENTATION: ORIENTATION: RIGHT;LOWER

## 2022-01-06 ASSESSMENT — PAIN SCALES - GENERAL
PAINLEVEL_OUTOF10: 9
PAINLEVEL_OUTOF10: 3
PAINLEVEL_OUTOF10: 6
PAINLEVEL_OUTOF10: 8
PAINLEVEL_OUTOF10: 0
PAINLEVEL_OUTOF10: 4

## 2022-01-06 ASSESSMENT — PAIN DESCRIPTION - LOCATION
LOCATION: ABDOMEN;HEAD
LOCATION: ABDOMEN

## 2022-01-06 ASSESSMENT — PAIN DESCRIPTION - FREQUENCY: FREQUENCY: CONTINUOUS

## 2022-01-06 NOTE — CONSULTS
Clinical Pharmacy Progress Note    Warfarin - Management by Pharmacy    Consult Date(s): 1-6-22  Consulting Provider(s):  Dr Rangel Rule / Plan    Hx DVT/PE, lupus anticoagulant - Warfarin   Goal INR: 2 - 3   Concurrent Anticoagulants / Antiplatelets: none   Interactions:Ropinerole (may increase INR)- home med   Current Regimen: New start  [facility] / Rationale:   o INR today = 2.6  o Previously stable on 5 mg daily; At [address] visit 1/4/22, INR was 4.1- likely 2/2 poor oral intake 2/2 vomiting;  dose held 1/4, then restarted at 2.5 mg daily with plan for INR check in 2 weeks  o Discussed with Dr Gloria Becerra- since UA revealed large amount of blood in urine, and CT revealed ureteral stone, will hold warfarin this evening  o Warfarin dosing placeholder entered; for now will order each dose based on INR  o Will monitor pt's clinical status and INR daily, and make dose adjustments as needed. Thank you for consulting Pharmacy! Please call with questions:    Alyson Ramos  Pharm. D. BCPS    1/6/2022 6:48 PM  [phone] (wireless)  [phone]  (office)        Interval update: CT abdomen/pelvis revealed 2 mm ureteral stone. UA revealed large amt of blood in urine (no UTI). Warfarin to be held today    Subjective/Objective: Ms. Janetta Pallas is a 73 y.o. female with a PMHx significant for DVT/PE 2/2 lupus anticoagulant, HTN, HFpEF, obesity. Admitted with MACI, abdominal pain, vomiting x several days. Pharmacy has been consulted to dose warfarin    Height:   Ht Readings from Last 1 Encounters:   09/17/19 5' 1\" (1.549 m)     Weight:   Wt Readings from Last 1 Encounters:   09/29/20 (!) 327 lb 12.8 oz (148.7 kg)       Prior / Home Warfarin Regimen:   Pt was stable on 5 mg daily until 1/4/22- INR was 4.1 ( 2/2  poor intake/vomiting).   Dose held 1/4, then restarted 2.5 mg daily with planned INR re-check in 2 weeks   Dosing managed [facility]ologist at [facility] [address]     Date INR Warfarin   1/4 4.1 HELD   1/5 -- 2.5 mg   1/6 2.6 HELD 2/2 hematuria          Labs / Ancillary Data:    Recent Labs     01/06/22  1020 01/06/22  1123   INR  --  2.60*   HGB 12.7  --      --    LABALBU 4.0  --    CREATININE 1.3*  --

## 2022-01-06 NOTE — PROGRESS NOTES
Pharmacy  Note  - Admission Medication History    List of [id number] medications is complete. I reviewed Rx fill history via \"Complete Dispense Report\" in Epic, and spoke to patient on phone. NOTE: Pt has not taken meds past 2 days as she has been vomiting constantly past 3 days (more intermittently in days prior past week). Except has been taking warfarin    The following changes made to [id number] medication list:    ADDED:   1) Duloxetine 60 mg nightly  2) Medical marijuana - had not used in > 1 week due to upset stomach  3) Biotin (dose?) daily  4) Simvastatin 20 mg daily  5) B-12 1000 mcg daily  6) Nifedipine XL 30 mg daily  7) Ondansetron 4mg PRN      Dose or Frequency CHANGE:  1) Ropinerole- takes 2 mg nightly, and repeats dose up to one time overnight PRN restless legs   (NOT 2 mg TID as currently ordered)  2) Warfarin - was on 5 mg daily, but on 1/4, anticoag visit with heme/onc - INR was 4.1, so held dose on 1/4, then decreased to 2.5 mg daily(last dose last devorah) until next INR check in 2 weeks  (see warfarin consult note for details)  3) Calcium - takes 1 tab (dose?) daily, not 2400 mg TID      REMOVED:  1) Alprazolam - last filled 9/2020 - pt did have a few tablets remaining and took them past week for sleep  2) Pred forte eye drops  3) Cyclobenzaprine  4) Cyclosporine eye drops  5) Valsartan/HCTZ   (takes lisinopril HCTZ)  6) Ambien    No current facility-administered medications on file prior to encounter.      Current Outpatient Medications on File Prior to Encounter   Medication Sig Dispense Refill    prednisoLONE acetate (PRED FORTE) 1 % ophthalmic suspension SHAKE LQ AND INT 1 GTT IN OS QID      lisinopril-hydroCHLOROthiazide (PRINZIDE;ZESTORETIC) 20-12.5 MG per tablet TK 1 T PO D      potassium chloride (KLOR-CON M) 20 MEQ extended release tablet Take 1 tablet by mouth daily Take together with lasix (Patient taking differently: Take 20 mEq by mouth daily ) 30 tablet 3    calcium 600 MG TABS tablet Take 2,400 mg by mouth 3 times daily       warfarin (COUMADIN) 5 MG tablet Take 5 mg by mouth every evening       HYDROcodone-acetaminophen (NORCO) 5-325 MG per tablet Take 1 tablet by mouth every 6 hours as needed for Pain .  ALPRAZolam (XANAX) 0.5 MG tablet Take 0.5 mg by mouth nightly as needed for Sleep      gabapentin (NEURONTIN) 400 MG capsule Take 400 mg by mouth 2 times daily.  valsartan-hydrochlorothiazide (DIOVAN HCT) 160-12.5 MG per tablet Take 1 tablet by mouth daily      rOPINIRole (REQUIP) 2 MG tablet Take 2 mg by mouth 3 times daily as needed       cyclobenzaprine (FLEXERIL) 10 MG tablet Take 10 mg by mouth 3 times daily as needed for Muscle spasms      cycloSPORINE (RESTASIS MULTIDOSE) 0.05 % ophthalmic emulsion Place 1 drop into both eyes 2 times daily      zolpidem (AMBIEN) 5 MG tablet Take 5 mg by mouth nightly as needed for Sleep       Please call with questions:    [facility]    1/6/2022 6:13 PM  [phone] (wireless)  [phone]  (office)

## 2022-01-06 NOTE — H&P
Hospital Medicine History & Physical      PCP: Jc Cortes MD    Date of Admission: 1/6/2022    Date of Service: 1/6/2022    Pt seen/examined on 1/6/2022    Admitted to Inpatient with expected LOS greater than two midnights due to medical therapy. Chief Complaint:     Chief Complaint   Patient presents with    Fever    Cough    Emesis    Abdominal Pain       History Of Present Illness:      73 y.o. female who presented to [facility] with a number of symptoms. She has had 3d of cough, myalgia, fever to 101, nausea and vomiting as well as RLQ pain that persisted since beginning until presentation. In the context of lupus AC and DVT/PE on warfarin. Admitted for further management. Past Medical History:      Reviewed and non-contributory except as noted below      Diagnosis Date    DVT (deep venous thrombosis) (HCC)     Hypertension     [facility]     PE [facility]boembolism) [facility]        Past Surgical History:      Reviewed and non-contributory except as noted below      Procedure Laterality Date    APPENDECTOMY      BRAIN SURGERY      CHOLECYSTECTOMY      FOOT SURGERY      TONSILLECTOMY         Medications Prior to Admission:      Reviewed and non-contributory except as noted below  Prior to Admission medications    Medication Sig Start Date End Date Taking?  Authorizing Provider   prednisoLONE acetate (PRED FORTE) 1 % ophthalmic suspension SHAKE LQ AND INT 1 GTT IN OS QID 9/15/20   Historical Provider, MD   lisinopril-hydroCHLOROthiazide (PRINZIDE;ZESTORETIC) 20-12.5 MG per tablet TK 1 T PO D 9/4/20   Historical Provider, MD   potassium chloride (KLOR-CON M) 20 MEQ extended release tablet Take 1 tablet by mouth daily Take together with lasix  Patient taking differently: Take 20 mEq by mouth daily  9/19/19   Connie Jimenes MD   calcium 600 MG TABS tablet Take 2,400 mg by mouth 3 times daily     Historical Provider, MD   warfarin (COUMADIN) 5 MG tablet Take 5 mg by mouth every evening     Historical Provider, MD   HYDROcodone-acetaminophen (NORCO) 5-325 MG per tablet Take 1 tablet by mouth every 6 hours as needed for Pain . Historical Provider, MD   ALPRAZolam Arpan Padilla) 0.5 MG tablet Take 0.5 mg by mouth nightly as needed for Sleep    Historical Provider, MD   gabapentin (NEURONTIN) 400 MG capsule Take 400 mg by mouth 2 times daily. Historical Provider, MD   valsartan-hydrochlorothiazide (DIOVAN HCT) 160-12.5 MG per tablet Take 1 tablet by mouth daily    Historical Provider, MD   rOPINIRole (REQUIP) 2 MG tablet Take 2 mg by mouth 3 times daily as needed     Historical Provider, MD   cyclobenzaprine (FLEXERIL) 10 MG tablet Take 10 mg by mouth 3 times daily as needed for Muscle spasms    Historical Provider, MD   cycloSPORINE (RESTASIS MULTIDOSE) 0.05 % ophthalmic emulsion Place 1 drop into both eyes 2 times daily    Historical Provider, MD   zolpidem (AMBIEN) 5 MG tablet Take 5 mg by mouth nightly as needed for Sleep    Historical Provider, MD       Allergies:     Reviewed and non-contributory except as noted below   No known allergies    Social History:      Reviewed and non-contributory except as noted below    TOBACCO:   reports that she has never smoked. She has never used smokeless tobacco.  ETOH:   reports current alcohol use. Family History:      Reviewed and non-contributory except as noted below        Problem Relation Age of Onset    Heart Attack Mother     Stroke Father     Cancer Sister         leukemia colon     Cancer Brother         bladder       REVIEW OF SYSTEMS:   Pertinent positives and negatives as noted in the HPI. All other systems reviewed and negative.     PHYSICAL EXAM PERFORMED:    BP (!) 121/57   Pulse 78   Resp 15   SpO2 94%     General appearance:  No acute distress, appears stated age  Eyes: Pupils equal, round, reactive to light, conjunctiva/corneas clear  Ears/Nose/Mouth/Throat: No external lesions or scars, hearing intact to voice  Neck: Trachea midline, no masses noted, no thyromegaly  Respiratory:  Non-labored breathing, clear to auscultation bilaterally  Cardiovascular: Regular rate and rhythm, no murmurs, gallops, or rubs  Abdomen: RLQ tenderness, soft  Musculoskeletal: Warm, well perfused, no cyanosis or edema  Skin: normal color, no wounds noted  Psychiatric: A&Ox4, good insight and judgment    Labs:     Recent Labs     01/06/22  1020   WBC 12.3*   HGB 12.7   HCT 37.5        Recent Labs     01/06/22  1020   *   K 3.9   CL 98*   CO2 29   BUN 19   CREATININE 1.3*   CALCIUM 8.4     Recent Labs     01/06/22  1020   AST 23   ALT 18   BILITOT 2.5*   ALKPHOS 77     Recent Labs     01/06/22  1123   INR 2.60*     Recent Labs     01/06/22  1020   TROPONINI <0.01       Urinalysis:      Lab Results   Component Value Date    NITRU Negative 09/17/2019    WBCUA  09/27/2017    RBCUA None seen 09/27/2017    BLOODU Negative 09/17/2019    SPECGRAV 1.010 09/17/2019    GLUCOSEU Negative 09/17/2019       Radiology:     CT ABDOMEN PELVIS W IV CONTRAST Additional Contrast? None   Final Result   1. Punctate, 2 mm calculus of the proximal to mid right ureter. Trace right hydronephrosis along with perinephric and periureteral fat stranding. 2. Small right pleural effusion and right basilar atelectasis. XR CHEST PORTABLE   Final Result      No acute chest process.           ASSESSMENT:    Active Hospital Problems    Diagnosis Date Noted    Abdominal pain [R10.9] 01/06/2022       PLAN:    # Abdominal pain  # R nephrolithiasis  -urology consulted  -trend cr  -analgesia as needed, including IV narcotics  -NPO at midnight in case of procedure  -blood cultures sent  -await UA to consider ABX if evidence of UTI    # MACI  -hold ACEI/ARB, continue hctz    # Lupus anticoagulant  # h/o DVT/PE  -continue warfarin, pharmacy to dose    # HTN  -home management except as above    DVT Prophylaxis: Warfarin  Diet: No diet orders on file  Code Status: Prior    PT/OT Eval Status: Ongoing    Dispo: Jeff Countcastillo pending clinical improvement    Adam Sadler MD    Thank you Jc Cortes MD for the opportunity to be involved in this patient's care. If you have any questions or concerns please feel free to contact me at [phone].

## 2022-01-06 NOTE — ED PROVIDER NOTES
[id number]          EM RESIDENT NOTE       Date of evaluation: 1/6/2022    Chief Complaint     Fever, Cough, Emesis, and Abdominal Pain      of Present Illness     Pam Booth is a 73 y.o. female history of DVT/PE secondary to lupus anticoagulant, hypertension, heart failure, obesity who presents to the emergency department for abdominal pain. The patient reports 2-3 days of cough, fever, myalgias, nausea, NBNB emesis, and RLQ abdominal pain. She reports that she has not had a bowel movement in about 3 days and thinks that it could be constipation. She does note that she has had a previous appendectomy and cholecystectomy. She denies any dysuria, hematuria, or flank pain. She has not noted any blood in her stool. She has not had any known sick contacts and denies any recent changes to her medications. No recent travel. She has been taking Tylenol for her symptoms with some relief. She denies any chest pain or shortness of breath. Review of Systems     Positive for fever, chills, cough, myalgias, nausea, vomiting, abdominal pain. Negative for chest pain, shortness of breath, diarrhea, pedal edema. All other symptoms as mentioned previously in the HPI. Past Medical, Surgical, Family, and Social History     She has a past medical history of DVT (deep venous thrombosis) ([facility] [address] Hypertension, Lupus anticoagulant disorder ([facility] [address] and PE (pulmonary thromboembolism) ([facility] [address] She has a past surgical history that includes brain surgery; Cholecystectomy; Foot surgery; Appendectomy; and Tonsillectomy. Her family history includes Cancer in her brother and sister; Heart Attack in her mother; Stroke in her father. She reports that she has never smoked. She has never used smokeless tobacco. She reports current alcohol use. She reports current drug use. Drug: Marijuana Lisseth Meneses).     Medications     Previous Medications    ALPRAZOLAM (XANAX) 0.5 MG TABLET    Take 0.5 mg by mouth nightly as needed for Sleep    CALCIUM 600 MG TABS TABLET    Take 2,400 mg by mouth 3 times daily     CYCLOBENZAPRINE (FLEXERIL) 10 MG TABLET    Take 10 mg by mouth 3 times daily as needed for Muscle spasms    CYCLOSPORINE (RESTASIS MULTIDOSE) 0.05 % OPHTHALMIC EMULSION    Place 1 drop into both eyes 2 times daily    GABAPENTIN (NEURONTIN) 400 MG CAPSULE    Take 400 mg by mouth 2 times daily. HYDROCODONE-ACETAMINOPHEN (NORCO) 5-325 MG PER TABLET    Take 1 tablet by mouth every 6 hours as needed for Pain . LISINOPRIL-HYDROCHLOROTHIAZIDE (PRINZIDE;ZESTORETIC) 20-12.5 MG PER TABLET    TK 1 T PO D    POTASSIUM CHLORIDE (KLOR-CON M) 20 MEQ EXTENDED RELEASE TABLET    Take 1 tablet by mouth daily Take together with lasix    PREDNISOLONE ACETATE (PRED FORTE) 1 % OPHTHALMIC SUSPENSION    SHAKE LQ AND INT 1 GTT IN OS QID    ROPINIROLE (REQUIP) 2 MG TABLET    Take 2 mg by mouth 3 times daily as needed     VALSARTAN-HYDROCHLOROTHIAZIDE (DIOVAN HCT) 160-12.5 MG PER TABLET    Take 1 tablet by mouth daily    WARFARIN (COUMADIN) 5 MG TABLET    Take 5 mg by mouth every evening     ZOLPIDEM (AMBIEN) 5 MG TABLET    Take 5 mg by mouth nightly as needed for Sleep       Allergies     She is allergic to no known allergies. Physical Exam     INITIAL VITALS: BP: 115/63,  , Pulse: 86, Resp: 18, SpO2: 100 %     Physical exam  General: well-appearing, no acute distress  HEENT: no discharge from the eyes or nose. OP clear. Dry mucous membranes. Cardiovascular: regular rate and rhythm, no murmurs. Strong pulses in all 4 extremities.   Pulmonary: non-labored breathing, normal lung sounds, speaks in full sentences  Abdominal: soft, mild ttp in the RLQ, no guarding, rebound, or rigidity, non-distended  Musculoskeletal: no long bone deformity  Extremities: no peripheral edema  Skin: dry, no rashes or lesions  Neuro: alert and oriented, speech and mentation normal, no focal deficits    DiagnosticResults     EKG Interpreted in conjunction with emergencydepartment physician Kurtis*    Normal sinus rhythm at a rate of 83 bpm.  Normal axis. Normal intervals. No ST segment changes. T wave inversions in III, aVF, V3-V6 which are seen on prior EKG. Low voltage QRS. No significant change when compared to previous EKG. RADIOLOGY:  CT ABDOMEN PELVIS W IV CONTRAST Additional Contrast? None   Final Result   1. Punctate, 2 mm calculus of the proximal to mid right ureter. Trace right hydronephrosis along with perinephric and periureteral fat stranding. 2. Small right pleural effusion and right basilar atelectasis. XR CHEST PORTABLE   Final Result      No acute chest process.           LABS:   Results for orders placed or performed during the hospital encounter of 01/06/22   CBC Auto Differential   Result Value Ref Range    WBC 12.3 (H) 4.0 - 11.0 K/uL    RBC 4.00 4.00 - 5.20 M/uL    Hemoglobin 12.7 12.0 - 16.0 g/dL    Hematocrit 37.5 36.0 - 48.0 %    MCV 93.9 80.0 - 100.0 fL    MCH 31.8 26.0 - 34.0 pg    MCHC 33.8 31.0 - 36.0 g/dL    RDW 14.4 12.4 - 15.4 %    Platelets 257 743 - 926 K/uL    MPV 8.2 5.0 - 10.5 fL    Neutrophils % 88.9 %    Lymphocytes % 4.8 %    Monocytes % 5.5 %    Eosinophils % 0.3 %    Basophils % 0.5 %    Neutrophils Absolute 11.0 (H) 1.7 - 7.7 K/uL    Lymphocytes Absolute 0.6 (L) 1.0 - 5.1 K/uL    Monocytes Absolute 0.7 0.0 - 1.3 K/uL    Eosinophils Absolute 0.0 0.0 - 0.6 K/uL    Basophils Absolute 0.1 0.0 - 0.2 K/uL   Comprehensive Metabolic Panel w/ Reflex to MG   Result Value Ref Range    Sodium 135 (L) 136 - 145 mmol/L    Potassium reflex Magnesium 3.9 3.5 - 5.1 mmol/L    Chloride 98 (L) 99 - 110 mmol/L    CO2 29 21 - 32 mmol/L    Anion Gap 8 3 - 16    Glucose 125 (H) 70 - 99 mg/dL    BUN 19 7 - 20 mg/dL    CREATININE 1.3 (H) 0.6 - 1.2 mg/dL    GFR Non- 41 (A) >60    GFR  49 (A) >60    Calcium 8.4 8.3 - 10.6 mg/dL    Total Protein 7.1 6.4 - 8.2 g/dL    Albumin 4.0 3.4 - 5.0 g/dL    Albumin/Globulin Ratio 1.3 1.1 - 2.2    Total Bilirubin 2.5 (H) 0.0 - 1.0 mg/dL    Alkaline Phosphatase 77 40 - 129 U/L    ALT 18 10 - 40 U/L    AST 23 15 - 37 U/L   Lactic Acid, Plasma   Result Value Ref Range    Lactic Acid 1.3 0.4 - 2.0 mmol/L       RECENT VITALS:  BP: 115/63,  , Pulse: 86,Resp: 18, SpO2: 100 %     Procedures         ED Course     Nursing Notes, Past Medical Hx, Past Surgical Hx, Social Hx, Allergies, and Family Hx were reviewed. The patient was given the followingmedications:  No orders of the defined types were placed in this encounter. CONSULTS:  None    MEDICAL DECISION MAKING / ASSESSMENT / Sanjana Magnolia is a 73 y.o. female who presents to the emergency department for abdominal pain. On arrival, patient is well-appearing with stable vital signs however SPO2 dropped to the high 80s and the patient required supplemental oxygen early in her ED course. On exam, she has dry mucous membranes and tenderness in the right lower quadrant abdomen but no evidence of peritonitis. Patient was given IV fluids and analgesia and states that she feels much better. Work-up is notable for mild leukocytosis, mild MACI. Hemoglobin is at baseline. INR is therapeutic. BNP is elevated troponin is undetectable. Hepatic panel and lipase are reassuring. Chest x-ray is unremarkable. I attempted to wean the patient's supplemental oxygen but she became mildly hypoxic to the high 80s. The appendix and gallbladder are surgically absent on record review which is reassuring against appendicitis or cholecystitis. CT abdomen pelvis was performed given her persistent abdominal pain which was notable for nephrolithiasis with associated hydronephrosis and fat stranding. Urinalysis was ordered but is pending at this time. Patient was also tested for flu which was negative and COVID-19 which is pending.   High suspicion for COVID given her fever, myalgias, cough, new oxygen requirement. Low suspicion for PE given that the patient is therapeutic on her warfarin. Ultimately, the patient will require admission for new oxygen requirement and her COVID test and urinalysis will be followed up by the admitting team.  This will help determine if there is a component of urinary tract infection or pyelonephritis in the setting of nephrolithiasis. On reassessment, patient remains well-appearing and vital signs are stable aside from her new oxygen requirement. All questions were answered appropriately. Patient verbalized understanding and agreement with the above treatment plan. This patient was also evaluated by the attending physician. All care plans were discussed and agreed upon. Clinical Impression     1. Nephrolithiasis        Disposition     PATIENT REFERRED TO:  No follow-up provider specified.     DISCHARGE MEDICATIONS:  New Prescriptions    No medications on file       DISPOSITION          Anderson Morris MD  Resident  01/12/22 1037

## 2022-01-06 NOTE — ED NOTES
Bed: B25-25  Expected date:   Expected time:   Means of arrival:   Comments:  Bed bug moy Foreman RN  01/06/22 [id number]

## 2022-01-06 NOTE — ED PROVIDER NOTES
ED Attending Attestation Note     Date of evaluation: 1/6/2022    This patient was seen by the resident. I have seen and examined the patient, agree with the workup, evaluation, management and diagnosis. The care plan has been discussed. I have reviewed the ECG and concur with the resident's interpretation. My assessment reveals with right lower quadrant tenderness to palpation without rebound or guarding. Has had appendectomy and cholecystectomy.      Randal Marie MD  01/06/22 1114

## 2022-01-07 LAB
ANION GAP SERPL CALCULATED.3IONS-SCNC: 7 MMOL/L (ref 3–16)
BUN BLDV-MCNC: 19 MG/DL (ref 7–20)
CALCIUM SERPL-MCNC: 8.4 MG/DL (ref 8.3–10.6)
CHLORIDE BLD-SCNC: 101 MMOL/L (ref 99–110)
CO2: 31 MMOL/L (ref 21–32)
CREAT SERPL-MCNC: 1.1 MG/DL (ref 0.6–1.2)
GFR AFRICAN AMERICAN: 60
GFR NON-AFRICAN AMERICAN: 49
GLUCOSE BLD-MCNC: 104 MG/DL (ref 70–99)
HCT VFR BLD CALC: 34.6 % (ref 36–48)
HEMOGLOBIN: 11.5 G/DL (ref 12–16)
INR BLD: 2.36 (ref 0.88–1.12)
MCH RBC QN AUTO: 31.5 PG (ref 26–34)
MCHC RBC AUTO-ENTMCNC: 33.3 G/DL (ref 31–36)
MCV RBC AUTO: 94.5 FL (ref 80–100)
PDW BLD-RTO: 14.4 % (ref 12.4–15.4)
PLATELET # BLD: 209 K/UL (ref 135–450)
PMV BLD AUTO: 8.1 FL (ref 5–10.5)
POTASSIUM REFLEX MAGNESIUM: 4 MMOL/L (ref 3.5–5.1)
PROTHROMBIN TIME: 27.6 SEC (ref 9.9–12.7)
RBC # BLD: 3.66 M/UL (ref 4–5.2)
SARS-COV-2, PCR: NOT DETECTED
SODIUM BLD-SCNC: 139 MMOL/L (ref 136–145)
WBC # BLD: 8 K/UL (ref 4–11)

## 2022-01-07 PROCEDURE — 97530 THERAPEUTIC ACTIVITIES: CPT

## 2022-01-07 PROCEDURE — 2580000003 HC RX 258: Performed by: INTERNAL MEDICINE

## 2022-01-07 PROCEDURE — 97166 OT EVAL MOD COMPLEX 45 MIN: CPT

## 2022-01-07 PROCEDURE — 6370000000 HC RX 637 (ALT 250 FOR IP): Performed by: INTERNAL MEDICINE

## 2022-01-07 PROCEDURE — 6370000000 HC RX 637 (ALT 250 FOR IP): Performed by: PHYSICIAN ASSISTANT

## 2022-01-07 PROCEDURE — 85027 COMPLETE CBC AUTOMATED: CPT

## 2022-01-07 PROCEDURE — 6370000000 HC RX 637 (ALT 250 FOR IP): Performed by: EMERGENCY MEDICINE

## 2022-01-07 PROCEDURE — 97116 GAIT TRAINING THERAPY: CPT

## 2022-01-07 PROCEDURE — 36415 COLL VENOUS BLD VENIPUNCTURE: CPT

## 2022-01-07 PROCEDURE — 80048 BASIC METABOLIC PNL TOTAL CA: CPT

## 2022-01-07 PROCEDURE — 97162 PT EVAL MOD COMPLEX 30 MIN: CPT

## 2022-01-07 PROCEDURE — 6360000002 HC RX W HCPCS: Performed by: INTERNAL MEDICINE

## 2022-01-07 PROCEDURE — 85610 PROTHROMBIN TIME: CPT

## 2022-01-07 PROCEDURE — 2060000000 HC ICU INTERMEDIATE R&B

## 2022-01-07 PROCEDURE — 97535 SELF CARE MNGMENT TRAINING: CPT

## 2022-01-07 RX ORDER — MECOBALAMIN 5000 MCG
5 TABLET,DISINTEGRATING ORAL NIGHTLY
Status: DISCONTINUED | OUTPATIENT
Start: 2022-01-07 | End: 2022-01-08 | Stop reason: HOSPADM

## 2022-01-07 RX ORDER — TAMSULOSIN HYDROCHLORIDE 0.4 MG/1
0.4 CAPSULE ORAL DAILY
Status: DISCONTINUED | OUTPATIENT
Start: 2022-01-07 | End: 2022-01-08 | Stop reason: HOSPADM

## 2022-01-07 RX ADMIN — MORPHINE SULFATE 2 MG: 2 INJECTION, SOLUTION INTRAMUSCULAR; INTRAVENOUS at 09:36

## 2022-01-07 RX ADMIN — ONDANSETRON 4 MG: 4 TABLET, ORALLY DISINTEGRATING ORAL at 22:52

## 2022-01-07 RX ADMIN — POTASSIUM CHLORIDE 20 MEQ: 20 TABLET, EXTENDED RELEASE ORAL at 09:35

## 2022-01-07 RX ADMIN — SENNOSIDES AND DOCUSATE SODIUM 1 TABLET: 50; 8.6 TABLET ORAL at 21:21

## 2022-01-07 RX ADMIN — GABAPENTIN 400 MG: 400 CAPSULE ORAL at 21:35

## 2022-01-07 RX ADMIN — MORPHINE SULFATE 4 MG: 4 INJECTION, SOLUTION INTRAMUSCULAR; INTRAVENOUS at 03:08

## 2022-01-07 RX ADMIN — DULOXETINE HYDROCHLORIDE 60 MG: 30 CAPSULE, DELAYED RELEASE ORAL at 21:21

## 2022-01-07 RX ADMIN — SODIUM CHLORIDE, PRESERVATIVE FREE 10 ML: 5 INJECTION INTRAVENOUS at 10:53

## 2022-01-07 RX ADMIN — ACETAMINOPHEN 650 MG: 325 TABLET ORAL at 16:48

## 2022-01-07 RX ADMIN — GABAPENTIN 400 MG: 400 CAPSULE ORAL at 09:35

## 2022-01-07 RX ADMIN — SENNOSIDES AND DOCUSATE SODIUM 1 TABLET: 50; 8.6 TABLET ORAL at 09:35

## 2022-01-07 RX ADMIN — Medication 500 MG: at 09:34

## 2022-01-07 RX ADMIN — TAMSULOSIN HYDROCHLORIDE 0.4 MG: 0.4 CAPSULE ORAL at 10:53

## 2022-01-07 RX ADMIN — HYDROCHLOROTHIAZIDE 12.5 MG: 25 TABLET ORAL at 09:35

## 2022-01-07 RX ADMIN — NIFEDIPINE 30 MG: 30 TABLET, FILM COATED, EXTENDED RELEASE ORAL at 09:34

## 2022-01-07 RX ADMIN — Medication 5 MG: at 22:52

## 2022-01-07 RX ADMIN — SODIUM CHLORIDE, PRESERVATIVE FREE 10 ML: 5 INJECTION INTRAVENOUS at 22:17

## 2022-01-07 RX ADMIN — ATORVASTATIN CALCIUM 10 MG: 20 TABLET, FILM COATED ORAL at 09:34

## 2022-01-07 RX ADMIN — ROPINIROLE 2 MG: 2 TABLET, FILM COATED ORAL at 21:21

## 2022-01-07 ASSESSMENT — PAIN SCALES - GENERAL
PAINLEVEL_OUTOF10: 4
PAINLEVEL_OUTOF10: 0
PAINLEVEL_OUTOF10: 0
PAINLEVEL_OUTOF10: 5
PAINLEVEL_OUTOF10: 0
PAINLEVEL_OUTOF10: 9
PAINLEVEL_OUTOF10: 4
PAINLEVEL_OUTOF10: 5

## 2022-01-07 ASSESSMENT — PAIN DESCRIPTION - PAIN TYPE
TYPE: ACUTE PAIN
TYPE: ACUTE PAIN

## 2022-01-07 ASSESSMENT — PAIN DESCRIPTION - LOCATION
LOCATION: HEAD;NECK
LOCATION: HEAD

## 2022-01-07 NOTE — PROGRESS NOTES
Occupational Therapy   Occupational Therapy Initial Assessment/ Treatment  Date: 2022   Patient Name: Neetu Peña  MRN: [id number]     : 1950    Date of Service: 2022    Discharge Recommendations:  Neetu Peña scored a 18/24 on the AM-PAC ADL Inpatient form. Current research shows that an AM-PAC score of 18 or greater is typically associated with a discharge to the patient's home setting. Based on the patient's AM-PAC score, and their current ADL deficits, it is recommended that the patient have 2-3 sessions per week of Occupational Therapy at d/c to increase the patient's independence. At this time, this patient demonstrates the endurance and safety to discharge home with home services) and a follow up treatment frequency of 2-3x/wk. Please see assessment section for further patient specific details. If patient discharges prior to next session this note will serve as a discharge summary. Please see below for the latest assessment towards goals. Assessment   Performance deficits / Impairments: Decreased functional mobility ; Decreased endurance;Decreased ADL status; Decreased balance    Assessment: Pt is a 72 y/o F who presents to the hospital below her baseline. Pt reports PLOF as independent w/ ADLs and functional mobility/transfers. Pt lives at home w/ her older sister, whom she provides assistance/care. Pt demo standing grooming tasks at sink w/ CGA this date and required seated rest breaks 2/2 increased fatigue. Pt O2 dropped from ~91 to 85~ w/ activity, however recovered w/ pursed lip breathing to 93. Pt also demo functional mobility and transfers w/ CGA and completed UE dressing task w/ Min A. Pt would benefit from [facility] services at d/c in order to maximize safety and increase functional independence.  Will cont to follow    Treatment Diagnosis: impaired ADLs and functional mobility/transfers  Prognosis: Good  Decision Making: Medium Complexity  OT Education: OT Role;Plan of Care;Transfer Training;Energy Conservation  Patient Education: verb understanding  REQUIRES OT FOLLOW UP: Yes  Activity Tolerance  Activity Tolerance: Patient Tolerated treatment well;Patient limited by fatigue  Safety Devices  Safety Devices in place: Yes  Type of devices: Left in chair;Nurse notified;Call light within reach; Chair alarm in place           Patient Diagnosis(es): The encounter diagnosis was Nephrolithiasis. has a past medical history of DVT (deep venous thrombosis) ([facility] [address] Hypertension, Lupus anticoagulant disorder ([facility] [address] and PE (pulmonary thromboembolism) ([facility] [address] has a past surgical history that includes brain surgery; Cholecystectomy; Foot surgery; Appendectomy; and Tonsillectomy. Treatment Diagnosis: impaired ADLs and functional mobility/transfers      Restrictions  Restrictions/Precautions  Restrictions/Precautions: Fall Risk (low fall risk)  Position Activity Restriction  Other position/activity restrictions: elevate head of bed x 30 degrees, up as tolerated    Subjective   General  Chart Reviewed: Yes  Patient assessed for rehabilitation services?: Yes  Additional Pertinent Hx: 73 y.o. female with a PMHx significant for DVT/PE 2/2 lupus anticoagulant, HTN, HFpEF, obesity. Admitted with MACI, abdominal pain (RLQ), vomiting x several days. CT abdomen pelvis (1/6)= Punctate, 2 mm calculus of the proximal to mid right ureter, trace right hydronephrosis along with perinephric and periureteral fat stranding, and Small right pleural effusion and right basilar atelectasis.   Family / Caregiver Present: No  Referring Practitioner: Obey Perdue MD  Diagnosis: Nephrolithiasis  Subjective  Subjective: Pt was supine in bed upon arrival and was agreeable to therapy eval and treat    Social/Functional History  Social/Functional History  Lives With:  (sister)  Type of Home: House  Home Layout: One level (laundry on first floor, no basement)  Home Access: Ramped entrance  Bathroom Shower/Tub: Tub/Shower unit (soaks in tub)  Bathroom Toilet: Standard  Bathroom Equipment: Grab bars in [address]: Cane,4 wheeled walker  ADL Assistance: [address]: Independent (no assistive device for household mobility, rollator or cane for community mobility)  Transfer Assistance: Independent  Active : Yes  Additional Comments: Pt. denies falls. Pt. reports assists her sister who has leukemia and recent surgeries. Objective   Vision: Within Functional Limits  Hearing: Within functional limits    Orientation  Overall Orientation Status: Within Normal Limits     Balance  Sitting Balance: Supervision  Standing Balance: Contact guard assistance  Standing Balance  Time: ~5min total  Activity: functional mobility in room; stance for ADL tasks  Functional Mobility  Functional - Mobility Device: No device  Activity: To/from bathroom  Assist Level: Contact guard assistance  Functional Mobility Comments: pt w/ intermittent hand held assist and furniture walking  Toilet Transfers  Toilet - Technique: Ambulating  Equipment Used: Standard toilet  Toilet Transfer: Contact guard assistance  Toilet Transfers Comments: heavy use of GB  ADL  Grooming: Setup; Increased time to complete;Minimal assistance (Pt in stance at sink to complete oral hygiene and hand hygiene w/ CGA 2/2 fatigue. Min A to comb hair and put into ponytail)  UE Dressing: Minimal assistance;Setup (Pt don/doff gown while seated EOB w/ Min A to fasten in back)  LE Dressing: Setup; Increased time to complete;Stand by assistance (pt seated at EOB to don socks)  Toileting: Contact guard assistance (Pt continent of urine w/ CGA in stance for clothing mgmt)  Additional Comments: Pt w/ increased SOB during ADLs and required seated rest breaks- symptoms improved w/ rest and pursed lip breathing. Pt reports \"just being excited\" as it was her first time out of bed.   Tone RUE  RUCONNER Tone: Normotonic  Tone LUE  LUE Tone: Normotonic  Coordination  Movements Are Fluid And Coordinated: Yes     Bed mobility  Supine to Sit: Modified independent (with elevated head of bed)  Scooting: Modified independent (seated at edge of bed)  Transfers  Sit to stand: Contact guard assistance  Stand to sit: Contact guard assistance     Cognition  Overall Cognitive Status: WFL        Sensation  Overall Sensation Status: WFL (pt denies numbness and tingling)        LUE AROM (degrees)  LUE AROM : WFL  Left Hand AROM (degrees)  Left Hand AROM: WFL  RUE AROM (degrees)  RUE AROM : WFL  Right Hand AROM (degrees)  Right Hand AROM: WFL  LUE Strength  Gross LUE Strength: WFL  RUE Strength  Gross RUE Strength: WFL     Hand Dominance  Hand Dominance: Right             Plan   Plan  Times per week: 2-5x  Times per day: Daily  Current Treatment Recommendations: Strengthening,Balance Training,Functional Mobility Training,Endurance Training,Self-Care / ADL,Home Management Training,Patient/Caregiver Education & Training    G-Code     OutComes Score                                                  AM-PAC Score        AM-PAC Inpatient Daily Activity Raw Score: 18 (01/07/22 1303)  AM-PAC Inpatient ADL T-Scale Score : 38.66 (01/07/22 1303)  ADL Inpatient CMS 0-100% Score: 46.65 (01/07/22 1303)  ADL Inpatient CMS G-Code Modifier : CK (01/07/22 1303)    Goals  Short term goals  Time Frame for Short term goals: by d/c  Short term goal 1: Pt will complete a grooming task in stance at sink w/ Mod I  Short term goal 2: Pt will complete toileting/ toilet transfer w/ Mod I  Patient Goals   Patient goals : not stated       Therapy Time   Individual Concurrent Group Co-treatment   Time In 1042         Time Out 1121         Minutes 39                   Timed Code Treatment Minutes:  24    Total Treatment Minutes:  [address] [place] [address]

## 2022-01-07 NOTE — CONSULTS
Urology Attending Consult Note      Reason for Consultation: Punctate R mid ureteral stone with mild hydro     History: 72 yo F with lupus on Warfarin, obesity and history of one kidney stone which she passed who presented to [facility] with fever, nausea/vomiting and myalgias. Denied urinary issues prior to admission. CT scan showed a 2mm stone in the mid R ureter with minimal hydronephrosis. Cr and WBC slightly elevated on admission. UA negative for infection. Febrile to 101.2 on admission, now afebrile. Family History, Social History, Review of Systems:  Reviewed and agreed to as per chart    Vitals:  BP (!) 149/69   Pulse 61   Temp 98.9 °F (37.2 °C) (Axillary)   Resp 21   Wt (!) 329 lb 12.9 oz (149.6 kg)   SpO2 95%   BMI 62.32 kg/m²   Temp  Av.1 °F (37.8 °C)  Min: 98.9 °F (37.2 °C)  Max: 101.2 °F (38.4 °C)    Intake/Output Summary (Last 24 hours) at 2022 0914  Last data filed at 2022 1750  Gross per 24 hour   Intake 1000 ml   Output --   Net 1000 ml         Physical:   Well developed, well nourished in no acute distress   Mood indicates no abnormalities. Pt doesnt appear depressed   Orientated to time and place   Neck is supple, trachea is midline   Respiratory effort is normal   Cardiovascular show no extremity swelling   Abdomen no masses or hernias are palpated, there is no tenderness. Liver and Spleen appear normal.   Skin show no abnormal lesions   Lymph nodes are not palpated in the inguinal, neck, or axillary area.     Female :    Purewick draining fabiola colored urine      Labs:  WBC:    Lab Results   Component Value Date    WBC 8.0 2022     Hemoglobin/Hematocrit:    Lab Results   Component Value Date    HGB 11.5 2022    HCT 34.6 2022     BMP:    Lab Results   Component Value Date     2022    K 4.0 2022     2022    CO2 31 2022    BUN 19 2022    LABALBU 4.0 2022    CREATININE 1.1 2022    CALCIUM 8.4 01/07/2022    GFRAA 60 01/07/2022    LABGLOM 49 01/07/2022     PT/INR:    Lab Results   Component Value Date    PROTIME 27.6 01/07/2022    INR 2.36 01/07/2022     PTT:  No results found for: APTT[APTT    Urinalysis: Negative    Antibiotic Therapy:  None    Imaging:   Impression   1. Punctate, 2 mm calculus of the proximal to mid right ureter. Trace right hydronephrosis along with perinephric and periureteral fat stranding. 2. Small right pleural effusion and right basilar atelectasis. Impression/Plan: 72 yo F with lupus on Warfarin admitted to [facility] with 2mm punctate mid ureteral stone.    -Patient reporting surge on pain last night but since has not felt any discomfort. No NV, does not feel feverish. Overall feels much better since admission  -Purewick draining fabiola colored urine  -UA negative  -Cr and WBC normalized, afebrile overnight  -Stone does not appear to be obstructive and urine is not showing infection at this time. Her bloodwork has normalized and she is afebrile.  May have passed stone last night and if not, she has high probability of passing stone and does not need procedure today.   -Start on flomax and continue fluids  -Can switch to regular diet   -DC per primary, call with any questions    PAULINA Go

## 2022-01-07 NOTE — PROGRESS NOTES
Physical Therapy    Facility/Department: [address]  Initial Assessment    NAME: Brandie Nelson  : 1950  MRN: [id number]    Date of Service: 2022    Discharge Recommendations:  Brandie Nelson scored a 20/24 on the AM-PAC short mobility form. Current research shows that an AM-PAC score of 18 or greater is typically associated with a discharge to the patient's home setting. Based on the patient's AM-PAC score and their current functional mobility deficits, it is recommended that the patient have 2-3 sessions per week of Physical Therapy at d/c to increase the patient's independence. At this time, this patient demonstrates the endurance and safety to discharge home with home PT and a follow up treatment frequency of 2-3x/wk. Please see assessment section for further patient specific details. If patient discharges prior to next session this note will serve as a discharge summary. Please see below for the latest assessment towards goals. HOME HEALTH CARE: LEVEL 1 STANDARD    - Initial home health evaluation to occur within 24-48 hours, in patient home   - Therapy to evaluate with goal of regaining prior level of functioning   - Therapy to evaluate if patient has [address] needs for personal care        PT Equipment Recommendations  Equipment Needed: No  Other: owns RW and cane    Assessment   Body structures, Functions, Activity limitations: Decreased functional mobility ; Decreased endurance;Decreased balance  Assessment: Patient demonstrates impaired functional mobilty, presenting below her typically (I) to modified (I) baseline - uses cane or rollator in home on an as needed basis, cane or rollator for all community mobility. Patient now CGA for standing mobility for gait in room, limited by endurance. Cues for pursed lip breathing during mobility/talking. Patient also missing cane she brought in, thinks it could still be in ED - RN notified.   Patient will continue to benefit from additional skilled PT intervention to facilitate safe mobility and to optimize (I) to promote return to prior level of function. Treatment Diagnosis: impaired functional mobility  Prognosis: Good  Decision Making: Medium Complexity  Patient Education: Patient educated on role of PT, use of call light, and PT recommendations, including pursed lip breathing - patient verbalizes understanding. Barriers to Learning: none  REQUIRES PT FOLLOW UP: Yes  Activity Tolerance  Activity Tolerance: Patient limited by endurance  Activity Tolerance: cues for pursed lip breathing during toileting/talking to return O2 to 93-98% - RN notified       Patient Diagnosis(es): The encounter diagnosis was Nephrolithiasis. has a past medical history of DVT (deep venous thrombosis) ([facility] [address] Hypertension, Lupus anticoagulant disorder ([facility] [address] and PE (pulmonary thromboembolism) ([facility] [address] has a past surgical history that includes brain surgery; Cholecystectomy; Foot surgery; Appendectomy; and Tonsillectomy. Restrictions  Restrictions/Precautions  Restrictions/Precautions: Fall Risk (low fall risk)  Position Activity Restriction  Other position/activity restrictions: elevate head of bed x 30 degrees, up as tolerated  Vision/Hearing  Vision: Within Functional Limits  Hearing: Within functional limits     Subjective  General  Chart Reviewed: Yes  Additional Pertinent Hx: ED 1/6 related to cough, myalgia, fever, nausea, vomiting, RLQ pain, right nephrolithiasis  Family / Caregiver Present: No  Referring Practitioner: Lucy Ritter MD  Referral Date : 01/06/22  Diagnosis: nephrolithiasis  Follows Commands: Within Functional Limits  General Comment  Comments: Patient semifowlers in bed upon arrival - agreeable to PT. Patient on 2L O2 at upon arrival, attempted room air 93%. RN aware. Subjective  Subjective: Patient reports nausea, recently given pain meds, denies pain during session.           Orientation  Orientation  Overall Orientation Status: Within Functional Limits  Social/Functional History  Social/Functional History  Lives With:  (sister)  Type of Home: House  Home Layout: One level (laundry on first floor, no basement)  Home Access: Ramped entrance  Bathroom Shower/Tub: Tub/Shower unit (soaks in tub)  Bathroom Toilet: Standard  Bathroom Equipment: Grab bars in [address]: Cane,4 wheeled walker  ADL Assistance: [address]: Independent (no assistive device for household mobility, rollator or cane for community mobility)  Transfer Assistance: Independent  Active : Yes  Additional Comments: Pt. denies falls. Pt. reports assists her sister who has leukemia and recent surgeries. Objective          AROM RLE (degrees)  RLE AROM: WFL  AROM LLE (degrees)  LLE AROM : WFL  Strength RLE  Strength RLE: WFL  Strength LLE  Strength LLE: WFL  Motor Control  Gross Motor?: WFL  Sensation  Overall Sensation Status: WFL (denies numbness or tingling)  Bed mobility  Supine to Sit: Modified independent (with elevated head of bed)  Scooting: Modified independent (seated at edge of bed)  Transfers  Sit to Stand: Stand by assistance  Stand to sit: Stand by assistance  Stand Pivot Transfers: Contact guard assistance (toilet transfer with CGA)  Ambulation  Ambulation?: Yes  Ambulation 1  Surface: level tile  Device: Hand-Held Assist  Assistance: Contact guard assistance  Quality of Gait: wide base of support, decreased (B) step length, foot clearance, and heel strike; fatigues easily - cues for pursed lip breathing during mobility and toileting  Distance: 30ft x 2  Comments: O2 saturation decreased to 85% while seated on toilet and talking - improved to 95% on room air with cues for pursed lip breathing; O2 otherwise 93-98% on room air during session. RN notified.      Balance  Posture: Fair (rounded shoulders, forward flexed)  Sitting - Static: Good  Sitting - Dynamic: Good  Standing - Static: Good  Standing - Dynamic: Good;-  Comments: SBA/CGA standing at sink for hygiene - limited by fatigue, SOB with cues for pursed lip breathing        Plan   Plan  Times per week: 2-5  Current Treatment Recommendations: Strengthening,ROM,Balance Training,Functional Mobility Training,Transfer Training,Gait Training,Endurance Training,Home Exercise Program,Safety Education & Training,Patient/Caregiver Education & Training,Equipment Evaluation, Education, & procurement  Safety Devices  Type of devices: Call light within reach,Chair alarm in place,Gait belt,Left in chair,Nurse notified    AM-PAC Score  AM-PAC Inpatient Mobility Raw Score : 20 (01/07/22 1148)  AM-PAC Inpatient T-Scale Score : 47.67 (01/07/22 1148)  Mobility Inpatient CMS 0-100% Score: 35.83 (01/07/22 1148)  Mobility Inpatient CMS G-Code Modifier : Rebel Tatum (01/07/22 1148)          Goals  Short term goals  Time Frame for Short term goals: discharge  Short term goal 1: Pt. will demonstrate sit <-> stand modified (I) with LRAD  Short term goal 2: Pt. will demonstrate stand pivot modified (I) with LRAD  Short term goal 3: Pt. will ambulate >/= 75ft modified (I) with LRAD  Patient Goals   Patient goals : \"to go home\"       Therapy Time   Individual Concurrent Group Co-treatment   Time In 1042         Time Out 1121         Minutes 39                 Timed Code Treatment Minutes: 24 minutes    Total Treatment Minutes: 44 Minutes    If patient discharges prior to next treatment, this note will serve as discharge summary.     Jennifer Ramirez PT, DPT #[id number]

## 2022-01-07 NOTE — PLAN OF CARE
Patient's bed/chair alarm activated, patients non-skid socks applied, items within reach, and patient utilizes her call light for assistance.     Patient's pain monitored and pain medication administered, patients pain reassessed and will continue to monitor

## 2022-01-07 NOTE — PROGRESS NOTES
Clinical Pharmacy Progress Note    Warfarin - Management by Pharmacy    Consult Date(s): 1-6-22  Consulting Provider(s):  Dr Laurel Mcnally / Plan    History of DVT / PE, Lupus Anticoagulant - Warfarin   Goal INR: 2 - 3   Concurrent Anticoagulants / Antiplatelets: none   Interactions:  o Ropinerole (may increase INR) - home med   Current Regimen: Plan to continue prior 2.5 mg daily regimen once ok with team.   Plan / Rationale:  o INR today = 2.36  o Previously stable on 5 mg daily; [address] visit 1/4/22, INR was 4.1- likely 2/2 poor oral intake 2/2 vomiting;  dose held 1/4, then restarted at 2.5 mg daily with plan for INR check in 2 weeks. o Dose held yesterday evening 2/2 UA which revealed large amt of blood in urine with ureteral stone visualized on CT.   o Discussed with Dr. Reva Virgen. Provider asks that warfarin dosing be held for today. Warfarin placeholder entered. o Will monitor pt's clinical status and INR daily, and make dose adjustments as needed. Thank you for allowing us to participate in the care of this patient. Please reach out with any questions. Rere Urrutia, PharmD, BCPS  1/7/2022  Wireless: [phone]      Interval update: CT abdomen/pelvis revealed 2 mm punctate ureteral stone. UA yesterday revealed large amt of blood in urine (no UTI). Pt HDS and afebrile (Tmax of 99.4F). Holding this evening's warfarin dose at Dr. Lilian Goff request.    Subjective/Objective: Ms. Corina Degroot is a 73 y.o. female with a PMHx significant for DVT/PE 2/2 lupus anticoagulant, HTN, HFpEF, obesity. Admitted with MACI, abdominal pain, vomiting x several days. Pharmacy has been consulted to dose warfarin    Height:   Ht Readings from Last 1 Encounters:   09/17/19 5' 1\" (1.549 m)     Weight:   Wt Readings from Last 1 Encounters:   01/07/22 (!) 329 lb 12.9 oz (149.6 kg)     Prior / Home Warfarin Regimen:   Pt was stable on 5 mg daily until 1/4/22- INR was 4.1 (2/2  poor intake/vomiting).  Dose held 1/4, then restarted 2.5 mg daily with planned INR re-check in 2 weeks.  Dosing managed by oncologist at [facility].     Date INR Warfarin   1/4 4.1 HELD   1/5 -- 2.5 mg   1/6 2.6 HELD 2/2 hematuria   1/7 2.36 HELD     Labs / Ancillary Data:    Recent Labs     01/06/22  1020 01/06/22  1123 01/07/22  0734   INR  --  2.60* 2.36*   HGB 12.7  --  11.5*     --  209   LABALBU 4.0  --   --    CREATININE 1.3*  --  1.1

## 2022-01-07 NOTE — PROGRESS NOTES
Patient's temperature up to 101.0 and patient complained of a headache, tylenol given and reassessment of temperature and headache completed at this time.   Temperature down to 100.0 and headache 0/10

## 2022-01-08 VITALS
DIASTOLIC BLOOD PRESSURE: 63 MMHG | TEMPERATURE: 98.7 F | WEIGHT: 293 LBS | SYSTOLIC BLOOD PRESSURE: 122 MMHG | OXYGEN SATURATION: 93 % | RESPIRATION RATE: 18 BRPM | HEIGHT: 61 IN | BODY MASS INDEX: 55.32 KG/M2 | HEART RATE: 75 BPM

## 2022-01-08 LAB
ANION GAP SERPL CALCULATED.3IONS-SCNC: 11 MMOL/L (ref 3–16)
BUN BLDV-MCNC: 17 MG/DL (ref 7–20)
CALCIUM SERPL-MCNC: 8.5 MG/DL (ref 8.3–10.6)
CHLORIDE BLD-SCNC: 97 MMOL/L (ref 99–110)
CO2: 28 MMOL/L (ref 21–32)
CREAT SERPL-MCNC: 0.8 MG/DL (ref 0.6–1.2)
GFR AFRICAN AMERICAN: >60
GFR NON-AFRICAN AMERICAN: >60
GLUCOSE BLD-MCNC: 98 MG/DL (ref 70–99)
HCT VFR BLD CALC: 33.3 % (ref 36–48)
HEMOGLOBIN: 11.5 G/DL (ref 12–16)
INR BLD: 1.79 (ref 0.88–1.12)
MCH RBC QN AUTO: 32 PG (ref 26–34)
MCHC RBC AUTO-ENTMCNC: 34.5 G/DL (ref 31–36)
MCV RBC AUTO: 92.8 FL (ref 80–100)
PDW BLD-RTO: 14.1 % (ref 12.4–15.4)
PLATELET # BLD: 232 K/UL (ref 135–450)
PMV BLD AUTO: 8.4 FL (ref 5–10.5)
POTASSIUM REFLEX MAGNESIUM: 4 MMOL/L (ref 3.5–5.1)
PROTHROMBIN TIME: 20.7 SEC (ref 9.9–12.7)
RBC # BLD: 3.59 M/UL (ref 4–5.2)
SODIUM BLD-SCNC: 136 MMOL/L (ref 136–145)
WBC # BLD: 8.4 K/UL (ref 4–11)

## 2022-01-08 PROCEDURE — 2580000003 HC RX 258: Performed by: INTERNAL MEDICINE

## 2022-01-08 PROCEDURE — 6370000000 HC RX 637 (ALT 250 FOR IP): Performed by: PHYSICIAN ASSISTANT

## 2022-01-08 PROCEDURE — 6370000000 HC RX 637 (ALT 250 FOR IP): Performed by: INTERNAL MEDICINE

## 2022-01-08 PROCEDURE — 85027 COMPLETE CBC AUTOMATED: CPT

## 2022-01-08 PROCEDURE — 36415 COLL VENOUS BLD VENIPUNCTURE: CPT

## 2022-01-08 PROCEDURE — 80048 BASIC METABOLIC PNL TOTAL CA: CPT

## 2022-01-08 PROCEDURE — 85610 PROTHROMBIN TIME: CPT

## 2022-01-08 RX ADMIN — ONDANSETRON 4 MG: 4 TABLET, ORALLY DISINTEGRATING ORAL at 08:21

## 2022-01-08 RX ADMIN — NIFEDIPINE 30 MG: 30 TABLET, FILM COATED, EXTENDED RELEASE ORAL at 08:15

## 2022-01-08 RX ADMIN — POTASSIUM CHLORIDE 20 MEQ: 20 TABLET, EXTENDED RELEASE ORAL at 08:14

## 2022-01-08 RX ADMIN — GABAPENTIN 400 MG: 400 CAPSULE ORAL at 08:14

## 2022-01-08 RX ADMIN — HYDROCHLOROTHIAZIDE 12.5 MG: 25 TABLET ORAL at 08:15

## 2022-01-08 RX ADMIN — ATORVASTATIN CALCIUM 10 MG: 20 TABLET, FILM COATED ORAL at 08:15

## 2022-01-08 RX ADMIN — Medication 500 MG: at 08:14

## 2022-01-08 RX ADMIN — SODIUM CHLORIDE, PRESERVATIVE FREE 10 ML: 5 INJECTION INTRAVENOUS at 08:16

## 2022-01-08 RX ADMIN — ACETAMINOPHEN 650 MG: 325 TABLET ORAL at 08:21

## 2022-01-08 RX ADMIN — SENNOSIDES AND DOCUSATE SODIUM 1 TABLET: 50; 8.6 TABLET ORAL at 08:15

## 2022-01-08 ASSESSMENT — PAIN SCALES - GENERAL
PAINLEVEL_OUTOF10: 0
PAINLEVEL_OUTOF10: 3
PAINLEVEL_OUTOF10: 0
PAINLEVEL_OUTOF10: 0
PAINLEVEL_OUTOF10: 3
PAINLEVEL_OUTOF10: 2

## 2022-01-08 ASSESSMENT — PAIN - FUNCTIONAL ASSESSMENT
PAIN_FUNCTIONAL_ASSESSMENT: ACTIVITIES ARE NOT PREVENTED

## 2022-01-08 ASSESSMENT — PAIN DESCRIPTION - ONSET
ONSET: PROGRESSIVE
ONSET: ON-GOING
ONSET: ON-GOING

## 2022-01-08 ASSESSMENT — PAIN DESCRIPTION - LOCATION
LOCATION: HEAD
LOCATION: HEAD

## 2022-01-08 ASSESSMENT — PAIN DESCRIPTION - ORIENTATION
ORIENTATION: LOWER
ORIENTATION: LOWER

## 2022-01-08 ASSESSMENT — PAIN DESCRIPTION - FREQUENCY
FREQUENCY: CONTINUOUS

## 2022-01-08 ASSESSMENT — PAIN DESCRIPTION - PAIN TYPE
TYPE: ACUTE PAIN
TYPE: ACUTE PAIN

## 2022-01-08 ASSESSMENT — PAIN DESCRIPTION - PROGRESSION
CLINICAL_PROGRESSION: NOT CHANGED
CLINICAL_PROGRESSION: GRADUALLY IMPROVING

## 2022-01-08 ASSESSMENT — PAIN DESCRIPTION - DESCRIPTORS
DESCRIPTORS: ACHING
DESCRIPTORS: ACHING

## 2022-01-08 NOTE — PROGRESS NOTES
Clinical Pharmacy Progress Note    Warfarin - Management by Pharmacy    Consult Date(s):  1/6/22  Consulting Provider(s):  Dr Moy Rich / Plan  1)  History of DVT / PE, Lupus Anticoagulant - Warfarin   Goal INR: 2 - 3   Concurrent Anticoagulants / Antiplatelets: none   Interactions:  o Ropinerole (may increase INR) - home med   Plan / Rationale:  o INR today = 1.79.    o Warfarin held 1/6 & 1/7 due to hematuria. Urine appears to be clearing. No plans for procedure for stone per .  o Discussed with Dr. Sandro Gan - plan for patient to be discharged today. Pt has an appt to see her physician on Monday 1/10 - planning to hold Warfarin until that appt, and will resume per outpt physician's recommendations. o No dose ordered today.  Will monitor pt's clinical status and INR daily, and make dose adjustments as needed. Please call with questions--  Thanks--  Erick Holloway, PharmD, BCPS, BCGP  [id number] ([facility])   1/8/2022 3:13 PM        Interval update:  Planning for discharge home today. Subjective/Objective: Ms. Kathy Strickland is a 73 y.o. female with a PMHx significant for DVT/PE 2/2 lupus anticoagulant, HTN, HFpEF, obesity. Admitted with MACI, hematuria, and kidney stone. Pharmacy has been consulted to dose warfarin    Ht Readings from Last 1 Encounters:   01/07/22 5' 1\" (1.549 m)     Wt Readings from Last 1 Encounters:   01/08/22 (!) 320 lb 6.4 oz (145.3 kg)     Prior / Home Warfarin Regimen:   Pt was stable on 5 mg daily until 1/4/22- INR was 4.1 (2/2  poor intake/vomiting). Dose held 1/4, then restarted 2.5 mg daily with planned INR re-check in 2 weeks.  Dosing managed by oncologist at [facility].     Date INR Warfarin   1/4 4.1 HELD   1/5 -- 2.5 mg   1/6 2.6 HELD 2/2 hematuria   1/7 2.36 HELD 2/2 hematuria   1/8 1.79                Recent Labs     01/06/22  1020 01/06/22  1123 01/07/22  0734 01/08/22  0534 01/08/22  0535   INR  --  2.60* 2.36*  -- 1.79*   HGB 12.7  --  11.5* 11.5*  --      --  209 232  --    LABALBU 4.0  --   --   --   --    CREATININE 1.3*  --  1.1 0.8  --

## 2022-01-08 NOTE — PROGRESS NOTES
4 Eyes Admission Assessment     I agree as the admission nurse that 2 RN's have performed a thorough Head to Toe Skin Assessment on the patient. ALL assessment sites listed below have been assessed on admission. Areas assessed by both nurses: Gricel Crass  [x]   Head, Face, and Ears   [x]   Shoulders, Back, and Chest  [x]   Arms, Elbows, and Hands   [x]   Coccyx, Sacrum, and Ischium  [x]   Legs, Feet, and Heels        Does the Patient have Skin Breakdown?   No  (redness under folds, healing)        Omar Prevention initiated:  Yes   Wound Care Orders initiated:  No      WOC nurse consulted for Pressure Injury (Stage 3,4, Unstageable, DTI, NWPT, and Complex wounds) or Omar score 18 or lower:  NA      Nurse 1 eSignature: Electronically signed by Starla Cote RN on 1/8/22 at 12:42 AM EST    **SHARE this note so that the co-signing nurse is able to place an eSignature**    Nurse 2 eSignature: Electronically signed by Reny Benavides RN on 1/8/22 at 12:53 AM EST

## 2022-01-08 NOTE — PROGRESS NOTES
Hospitalist Progress Note      SYNOPSIS: Patient admitted on 2022 for <principal problem not specified>      SUBJECTIVE:  Pt was admitted for right flank pain,n/v and headache. Her abd CT showed   Right renal stone fragments. She also had hematuria. She was seen by urology  today  and   they decided pt did not need a renal stent. Pain is controlled. Coumadin has been stopped due to hematuria. Stable overnight. No other overnight issues reported. Patient seen and examined  Records reviewed. Temp (24hrs), Av.5 °F (37.5 °C), Min:98.7 °F (37.1 °C), Max:101 °F (38.3 °C)    DIET: ADULT DIET; Regular; 3 carb choices (45 gm/meal); Low Fat/Low Chol/High Fiber/TREVIN; Low Sodium (2 gm)  CODE: Full Code    Intake/Output Summary (Last 24 hours) at 2022 1613  Last data filed at 2022 1200  Gross per 24 hour   Intake 480 ml   Output 1450 ml   Net -970 ml       Review of Systems  All bolded are positive; please see HPI  General:  Fever, chills, diaphoresis, fatigue, malaise, night sweats, weight loss  Psychological:  Anxiety, disorientation, hallucinations. ENT:  Epistaxis, headaches, vertigo, visual changes. Cardiovascular:  Chest pain, irregular heartbeats, palpitations, paroxysmal nocturnal dyspnea. Respiratory:  Shortness of breath, coughing, sputum production, hemoptysis, wheezing, orthopnea.   Gastrointestinal:  Nausea, vomiting, diarrhea, heartburn, constipation, abdominal pain, hematemesis, hematochezia, melena, acholic stools  Genito-Urinary:  Dysuria, urgency, frequency, hematuria  Musculoskeletal:  Joint pain, joint stiffness, joint swelling, muscle pain  Neurology:  Headache, focal neurological deficits, weakness, numbness, paresthesia  Derm:  Rashes, ulcers, excoriations, bruising  Extremities:  Decreased ROM, peripheral edema, mottling      OBJECTIVE:    /63   Pulse 75   Temp 98.7 °F (37.1 °C) (Oral)   Resp 18   Ht 5' 1\" (1.549 m)   Wt (!) 320 lb 6.4 oz (145.3 kg)   SpO2 93% BMI 60.54 kg/m²     General appearance:  awake, alert, and oriented to person, place, time, and purpose; appears stated age and cooperative; no apparent distress no labored breathing  HEENT:  Conjunctivae/corneas clear. Neck: Supple. No jugular venous distention. Respiratory: symmetrical; clear to auscultation bilaterally; no wheezes; no rhonchi; no rales  Cardiovascular: rhythm regular; rate controlled; no murmurs  Abdomen: right flank pain  Extremities:  peripheral pulses present; no peripheral edema; no ulcers  Musculoskeletal: No clubbing, cyanosis, no bilateral lower extremity edema. Brisk capillary refill.    Skin:  No rashes  on visible skin  Neurologic: awake, alert and following commands     ASSESSMENT and PLAN:    -Hematuria  -Pyelonephritis  Renal colic      PLAN:  -pt will be watched for another 24 hours   -continue rocephin  -monitor labs  -continue to hold coumadin (2.36 today)         DVT Prophylaxis [] Lovenox, []  Heparin, [] SCDs, [] Ambulation   GI Prophylaxis [] PPI,  [] H2 Blocker,  [] Carafate,  [] Diet/Tube Feeds   Disposition Patient requires continued admission due to    MDM [] Low, [] Moderate,[]  High  Patient's risk as above due to        Medications:  REVIEWED DAILY    Infusion Medications    sodium chloride       Scheduled Medications    tamsulosin  0.4 mg Oral Daily    melatonin  5 mg Oral Nightly    gabapentin  400 mg Oral BID    hydroCHLOROthiazide  12.5 mg Oral Daily    sodium chloride flush  10 mL IntraVENous 2 times per day    sennosides-docusate sodium  1 tablet Oral BID    atorvastatin  10 mg Oral Daily    potassium chloride  20 mEq Oral Daily    NIFEdipine  30 mg Oral Daily    DULoxetine  60 mg Oral Nightly    warfarin (COUMADIN) daily dosing (placeholder)   Other See Admin Instructions    calcium elemental  500 mg Oral Daily    rOPINIRole  2 mg Oral Nightly     PRN Meds: sodium chloride flush, sodium chloride, ondansetron **OR** ondansetron, magnesium hydroxide, acetaminophen **OR** acetaminophen, rOPINIRole    Labs:     Recent Labs     01/06/22  1020 01/07/22  0734 01/08/22  0534   WBC 12.3* 8.0 8.4   HGB 12.7 11.5* 11.5*   HCT 37.5 34.6* 33.3*    209 232       Recent Labs     01/06/22  1020 01/07/22  0734 01/08/22  0534   * 139 136   K 3.9 4.0 4.0   CL 98* 101 97*   CO2 29 31 28   BUN 19 19 17   CREATININE 1.3* 1.1 0.8   CALCIUM 8.4 8.4 8.5       Recent Labs     01/06/22  1020   PROT 7.1   ALKPHOS 77   ALT 18   AST 23   BILITOT 2.5*   LIPASE 23.0       Recent Labs     01/06/22  1123 01/07/22  0734 01/08/22  0535   INR 2.60* 2.36* 1.79*       Recent Labs     01/06/22  1020   TROPONINI <0.01       Chronic labs:    Lab Results   Component Value Date    CHOL 153 09/18/2019    TRIG 189 (H) 09/18/2019    HDL 51 09/18/2019    LDLCALC 64 09/18/2019    INR 1.79 (H) 01/08/2022       Radiology: REVIEWED DAILY    +++++++++++++++++++++++++++++++++++++++++++++++++  Cathie Mauricio, DO ODONNELL  [employer] - [address], [place]  +++++++++++++++++++++++++++++++++++++++++++++++++  NOTE: This report was transcribed using voice recognition software. Every effort was made to ensure accuracy; however, inadvertent computerized transcription errors may be present.

## 2022-01-08 NOTE — PROGRESS NOTES
Reviewed discharge instructions with patient. Reviewed discharge medications including dosing, schedule, indication, and adverse reactions. Reviewed which medications were already taken today and next dosage due for each medication. Reviewed signs and symptoms that prompt a call to the physician and appropriate phone numbers. Patient verbalized understanding of all instructions and questions were answered to her. satisfaction. Signed discharge instructions were given to the patient and a copy placed in the paper-lite chart. Patient discharged to home per wheelchair.   Rama Koch RN

## 2022-01-08 NOTE — PLAN OF CARE
Problem: Infection:  Goal: Will remain free from infection  Description: Will remain free from infection  Outcome: Ongoing   Patient has remained afebrile since transfer. Will continue to monitor and assess. Problem: Safety:  Goal: Free from accidental physical injury  Description: Free from accidental physical injury  1/8/2022 0136 by Giulia White RN  Outcome: Ongoing   Patient is a SBA. Ambulating to and from bathroom, tolerating well. Problem: Pain:  Goal: Patient's pain/discomfort is manageable  Description: Patient's pain/discomfort is manageable  1/8/2022 0136 by Giulia White RN  Outcome: Ongoing   Patient had no complaints of pain this shift. Will continue to monitor and assess. Problem: Skin Integrity:  Goal: Skin integrity will stabilize  Description: Skin integrity will stabilize  Outcome: Ongoing   4 Eyes was performed this shift as per transfer protocol. Slight redness noted in skin folds, but healing nicely. Will continue to monitor and assess. Problem: Discharge Planning:  Goal: Patients continuum of care needs are met  Description: Patients continuum of care needs are met  Outcome: Ongoing   Patient oriented to current plan of care. Problem: Falls - Risk of:  Goal: Will remain free from falls  Description: Will remain free from falls  Outcome: Ongoing   Orthostatic vital signs obtained at start of shift - see flowsheet for details. Pt does not meet criteria for orthostasis. Pt is a Med fall risk. See Nolon Dago Fall Score and ABCDS Injury Risk assessments.   + Screening for Orthostasis and/or + High Fall Risk per BOX/ABCDS: Explained fall risk precautions to pt and family and rationale behind their use to keep the patient safe. Pt bed is in low position, side rails up, call light and belongings are in reach. Fall wristband applied and present on pts wrist.  Bed alarm on. Pt encouraged to call for assistance.  Will continue with hourly rounds for PO intake, pain needs, toileting and repositioning as needed.

## 2022-01-08 NOTE — PROGRESS NOTES
Patient transferred from 10 S @ [phone] via wheelchair. Oriented to room and floor, patient is a SBA. Patient received Tylenol for a fever prior to transfer, on arrival was 99.5. Will continue to monitor. Patient had no further questions at this time.

## 2022-01-10 LAB
BLOOD CULTURE, ROUTINE: NORMAL
CULTURE, BLOOD 2: NORMAL

## 2022-01-18 NOTE — PROGRESS NOTES
Physician Progress Note      PATIENT:               Nadia Solano  CSN #:                  [id number]  :                       1950  ADMIT DATE:       2022 9:47 AM  DISCH DATE:        2022 4:12 PM  RESPONDING  PROVIDER #:        Karla Schmidt          QUERY TEXT:    Patient admitted with Abd pain and Rt ureteral stone with mild hydronephrosis   and  and noted to have WBC 12.3, Temp 101.2 HR 97, RR 29 on admission. If   possible, please document in progress notes and discharge summary if you are   evaluating and/or treating any of the following: The medical record reflects the following:  Risk Factors: 72 y/o with right ureteral stone and hydronephrosis, MACI  Clinical Indicators:  Urology PN: \"CT scan showed a 2mm stone in the mid R   ureter with minimal hydronephrosis. Cr and WBC slightly elevated on admission. UA negative for infection. \" WBC 12.3, Temp 101.2 HR 97, RR 29  Treatment: Urology consult, Flomax, Labs, LR bolus  Options provided:  -- SIRS of non-infectious origin due to  R ureter stone with minimal   hydronephrosis with MACI  -- Other - I will add my own diagnosis  -- Disagree - Not applicable / Not valid  -- Disagree - Clinically unable to determine / Unknown  -- Refer to Clinical Documentation Reviewer    PROVIDER RESPONSE TEXT:    This patient has SIRS of non-infectious origin due to R ureter stone with   minimal hydronephrosis with MACI.     Query created by: Giulia Dodson on 2022 7:07 AM      Electronically signed by:  Karla Schmidt 2022 1:49 PM

## 2023-01-18 ENCOUNTER — HOSPITAL ENCOUNTER (OUTPATIENT)
Age: 73
Setting detail: SPECIMEN
Discharge: HOME | End: 2023-01-18
Payer: MEDICARE

## 2023-01-18 LAB
ALBUMIN SERPL-MCNC: 4.5 G/DL (ref 3.4–5)
ANION GAP SERPL CALCULATED.3IONS-SCNC: 16 MMOL/L (ref 3–16)
BASOPHILS ABSOLUTE: 0.1 K/UL (ref 0–0.2)
BASOPHILS RELATIVE PERCENT: 0.5 %
BUN BLDV-MCNC: 39 MG/DL (ref 7–20)
CALCIUM SERPL-MCNC: 9.4 MG/DL (ref 8.3–10.6)
CHLORIDE BLD-SCNC: 94 MMOL/L (ref 99–110)
CO2: 30 MMOL/L (ref 21–32)
CREAT SERPL-MCNC: 1.4 MG/DL (ref 0.6–1.2)
EOSINOPHILS ABSOLUTE: 0.1 K/UL (ref 0–0.6)
EOSINOPHILS RELATIVE PERCENT: 0.5 %
GFR SERPL CREATININE-BSD FRML MDRD: 40 ML/MIN/{1.73_M2}
GLUCOSE BLD-MCNC: 117 MG/DL (ref 70–99)
HCT VFR BLD CALC: 32.8 % (ref 36–48)
HEMOGLOBIN: 10.8 G/DL (ref 12–16)
LYMPHOCYTES ABSOLUTE: 1.4 K/UL (ref 1–5.1)
LYMPHOCYTES RELATIVE PERCENT: 10.5 %
MCH RBC QN AUTO: 29.6 PG (ref 26–34)
MCHC RBC AUTO-ENTMCNC: 32.9 G/DL (ref 31–36)
MCV RBC AUTO: 89.9 FL (ref 80–100)
MONOCYTES ABSOLUTE: 0.5 K/UL (ref 0–1.3)
MONOCYTES RELATIVE PERCENT: 3.8 %
NEUTROPHILS ABSOLUTE: 11 K/UL (ref 1.7–7.7)
NEUTROPHILS RELATIVE PERCENT: 84.7 %
PDW BLD-RTO: 14.5 % (ref 12.4–15.4)
PHOSPHORUS: 4.3 MG/DL (ref 2.5–4.9)
PLATELET # BLD: 355 K/UL (ref 135–450)
PMV BLD AUTO: 7.7 FL (ref 5–10.5)
POTASSIUM SERPL-SCNC: 4.9 MMOL/L (ref 3.5–5.1)
RBC # BLD: 3.65 M/UL (ref 4–5.2)
SODIUM BLD-SCNC: 140 MMOL/L (ref 136–145)
WBC # BLD: 13 K/UL (ref 4–11)

## 2023-01-18 PROCEDURE — 36415 COLL VENOUS BLD VENIPUNCTURE: CPT

## 2023-01-18 PROCEDURE — 80069 RENAL FUNCTION PANEL: CPT

## 2023-01-18 PROCEDURE — 85025 COMPLETE CBC W/AUTO DIFF WBC: CPT
